# Patient Record
Sex: FEMALE | Race: AMERICAN INDIAN OR ALASKA NATIVE
[De-identification: names, ages, dates, MRNs, and addresses within clinical notes are randomized per-mention and may not be internally consistent; named-entity substitution may affect disease eponyms.]

---

## 2017-04-03 ENCOUNTER — HOSPITAL ENCOUNTER (EMERGENCY)
Dept: HOSPITAL 14 - H.ER | Age: 27
Discharge: HOME | End: 2017-04-03
Payer: COMMERCIAL

## 2017-04-03 VITALS
DIASTOLIC BLOOD PRESSURE: 74 MMHG | RESPIRATION RATE: 18 BRPM | HEART RATE: 104 BPM | TEMPERATURE: 98.2 F | OXYGEN SATURATION: 99 % | SYSTOLIC BLOOD PRESSURE: 130 MMHG

## 2017-04-03 DIAGNOSIS — F41.9: ICD-10-CM

## 2017-04-03 DIAGNOSIS — J06.9: Primary | ICD-10-CM

## 2017-04-03 DIAGNOSIS — F20.9: ICD-10-CM

## 2017-04-03 NOTE — ED PDOC
HPI: CCC, URI, Sore Throat


Time Seen by Provider: 04/03/17 22:52


Chief Complaint (Nursing): Cough, Cold, Congestion


Chief Complaint (Provider): uri


History Per: Patient (27 y/o female h/o Schizophrenia here with complaint of URI

/nasal congestion x 2 days associated with chills. No vomiting/diarrhea/etc.)





Past Medical History


Reviewed: Historical Data, Nursing Documentation, Vital Signs


Vital Signs: 





 Last Vital Signs











Temp  98.2 F   04/03/17 21:58


 


Pulse  104 H  04/03/17 21:58


 


Resp  18   04/03/17 21:58


 


BP  130/74   04/03/17 21:58


 


Pulse Ox  99   04/03/17 21:58














- Medical History


PMH: Anxiety, Depression, Schizophrenia





- Family History


Family History: States: No Known Family Hx





- Home Medications


Home Medications: 


 Ambulatory Orders











 Medication  Instructions  Recorded


 


Ciprofloxacin/Dexamethasone 4 drop OT BID #1 bottle 10/05/14





[Ciprodex 0.3%-0.1% 7.5 Ml]  


 


Neomycin/Polymyxin/Hydrocortis 3 drop OT TID #1 bottle 10/06/14





[Cortisporin Otic Susp]  


 


Acetic Acid/Antipyrine/Benzo 2 drop AD TID #1 sol 12/01/14





[Auralgan 14 ml]  


 


Acetaminophen [Acetaminophen Extra 2 tab PO Q6 PRN #24 tablet 04/03/17





Strength]  


 


Fluticasone Propionate [Flonase] 2 spr IN DAILY #1 bottle 04/03/17


 


Non-Formulary 1 ea IN DAILY PRN #1 ea 04/03/17














- Allergies


Allergies/Adverse Reactions: 


 Allergies











Allergy/AdvReac Type Severity Reaction Status Date / Time


 


No Known Allergies Allergy   Verified 10/05/14 10:25














Review of Systems


ROS Statement: Except As Marked, All Systems Reviewed And Found Negative


ENT: Positive for: Nose Congestion





Physical Exam





- Reviewed


Nursing Documentation Reviewed: Yes


Vital Signs Reviewed: Yes





- Physical Exam


Appears: Positive for: Well, Non-toxic, No Acute Distress


Head Exam: Positive for: ATRAUMATIC, NORMAL INSPECTION, NORMOCEPHALIC


Skin: Positive for: Normal Color, Warm, DRY


Eye Exam: Positive for: EOMI, Normal appearance, PERRL


ENT: Positive for: Normal ENT Inspection, Nasal Congestion


Neck: Positive for: Normal, Painless ROM


Cardiovascular/Chest: Positive for: Regular Rate, Rhythm


Respiratory: Positive for: CNT, Normal Breath Sounds


Gastrointestinal/Abdominal: Positive for: Normal Exam, Bowel Sounds, Soft


Back: Positive for: Normal Inspection


Extremity: Positive for: Normal ROM


Neurologic/Psych: Positive for: Alert, Oriented





- ECG


O2 Sat by Pulse Oximetry: 99





- Progress


ED Course And Treament: 


acetaminophen 975mg x 1 dose





Patient is on multiple medications for schizophrenia.  d/w her use of nasal 

saline rinse instead of nasal decongestants.





Disposition





- Clinical Impression


Clinical Impression: 


 URI (upper respiratory infection)





- Patient ED Disposition


Is Patient to be Admitted: No





- Disposition


Disposition: Routine/Home


Disposition Time: 22:55


Condition: FAIR


Prescriptions: 


Acetaminophen [Acetaminophen Extra Strength] 2 tab PO Q6 PRN #24 tablet


 PRN Reason: Headache


Fluticasone Propionate [Flonase] 2 spr IN DAILY #1 bottle


Non-Formulary 1 ea IN DAILY PRN #1 ea


 PRN Reason: Cough And Congestion


Instructions:  Upper Respiratory Infection (ED)


Forms:  [facility] ED School/Work Excuse

## 2017-05-21 ENCOUNTER — HOSPITAL ENCOUNTER (INPATIENT)
Dept: HOSPITAL 14 - H.ER | Age: 27
LOS: 5 days | Discharge: HOME | DRG: 430 | End: 2017-05-26
Attending: PSYCHIATRY & NEUROLOGY | Admitting: PSYCHIATRY & NEUROLOGY
Payer: MEDICAID

## 2017-05-21 VITALS — OXYGEN SATURATION: 98 %

## 2017-05-21 DIAGNOSIS — F32.9: ICD-10-CM

## 2017-05-21 DIAGNOSIS — J06.9: ICD-10-CM

## 2017-05-21 DIAGNOSIS — F41.9: ICD-10-CM

## 2017-05-21 DIAGNOSIS — D64.9: ICD-10-CM

## 2017-05-21 DIAGNOSIS — F22: ICD-10-CM

## 2017-05-21 DIAGNOSIS — F25.9: Primary | ICD-10-CM

## 2017-05-21 LAB
ALBUMIN/GLOB SERPL: 1.1 {RATIO} (ref 1–2.1)
ALP SERPL-CCNC: 59 U/L (ref 38–126)
ALT SERPL-CCNC: 27 U/L (ref 9–52)
AST SERPL-CCNC: 20 U/L (ref 14–36)
BASOPHILS # BLD AUTO: 0.1 K/UL (ref 0–0.2)
BASOPHILS NFR BLD: 0.6 % (ref 0–2)
BILIRUB SERPL-MCNC: 0.1 MG/DL (ref 0.2–1.3)
BILIRUB UR-MCNC: NEGATIVE MG/DL
BUN SERPL-MCNC: 8 MG/DL (ref 7–17)
CALCIUM SERPL-MCNC: 9 MG/DL (ref 8.4–10.2)
CHLORIDE SERPL-SCNC: 103 MMOL/L (ref 98–107)
CO2 SERPL-SCNC: 26 MMOL/L (ref 22–30)
COLOR UR: (no result)
EOSINOPHIL # BLD AUTO: 0.2 K/UL (ref 0–0.7)
EOSINOPHIL NFR BLD: 2 % (ref 0–4)
ERYTHROCYTE [DISTWIDTH] IN BLOOD BY AUTOMATED COUNT: 15.3 % (ref 11.5–14.5)
ETHANOL SERPL-MCNC: < 10 MG/DL (ref 0–10)
GLOBULIN SER-MCNC: 4.1 GM/DL (ref 2.2–3.9)
GLUCOSE SERPL-MCNC: 94 MG/DL (ref 65–105)
GLUCOSE UR STRIP-MCNC: (no result) MG/DL
HCT VFR BLD CALC: 36.1 % (ref 34–47)
KETONES UR STRIP-MCNC: (no result) MG/DL
LEUKOCYTE ESTERASE UR-ACNC: (no result) LEU/UL
LYMPHOCYTES # BLD AUTO: 2.4 K/UL (ref 1–4.3)
LYMPHOCYTES NFR BLD AUTO: 28.5 % (ref 20–40)
MCH RBC QN AUTO: 30.7 PG (ref 27–31)
MCHC RBC AUTO-ENTMCNC: 33.2 G/DL (ref 33–37)
MCV RBC AUTO: 92.7 FL (ref 81–99)
MONOCYTES # BLD: 0.9 K/UL (ref 0–0.8)
MONOCYTES NFR BLD: 11.3 % (ref 0–10)
NEUTROPHILS # BLD: 4.8 K/UL (ref 1.8–7)
NEUTROPHILS NFR BLD AUTO: 57.6 % (ref 50–75)
NRBC BLD AUTO-RTO: 0.2 % (ref 0–0)
PH UR STRIP: 6 [PH] (ref 5–8)
PLATELET # BLD: 199 K/UL (ref 130–400)
PMV BLD AUTO: 9.5 FL (ref 7.2–11.7)
POTASSIUM SERPL-SCNC: 4.2 MMOL/L (ref 3.6–5)
PROT SERPL-MCNC: 8.6 G/DL (ref 6.3–8.2)
PROT UR STRIP-MCNC: 30 MG/DL
RBC # UR STRIP: (no result) /UL
SODIUM SERPL-SCNC: 140 MMOL/L (ref 132–148)
SP GR UR STRIP: 1.03 (ref 1–1.03)
UROBILINOGEN UR-MCNC: (no result) MG/DL (ref 0.2–1)
WBC # BLD AUTO: 8.3 K/UL (ref 4.8–10.8)

## 2017-05-21 NOTE — ED PDOC
HPI: Psych/Substance Abuse


Time Seen by Provider: 05/21/17 13:33


Chief Complaint (Nursing): Psychiatric Evaluation


Chief Complaint (Provider): Anxiety


History Per: Patient


History/Exam Limitations: no limitations


Onset/Duration Of Symptoms: Days (x3)


Current Symptoms Are (Timing): Still Present


Suicide/Self Injury Attempted (Context): None


Modifying Factor(s): None


Severity: Mild


Associated Symptoms: Anxiety, Depression, Paranoia


Additional Complaint(s): 





Patient is a 27 year old female presenting ot the ED complaining of anxiety x3 

days. Anxiety is associated with depression and paranoia. Patient has not been 

taking her psych medication for the past 3 days because she left her medication 

at a friends house who is currently on vacation. Denies hallucinations, 

suicidal ideation, and homicidal ideation. Patient subsequently complains of 

cough and congestion. Denies fever, shortness of breath, or chest pain.





PMD: none





Past Medical History


Reviewed: Historical Data, Nursing Documentation, Vital Signs


Vital Signs: 





 Last Vital Signs











Temp  97.3 F L  05/21/17 13:56


 


Pulse  106 H  05/21/17 13:56


 


Resp  18   05/21/17 13:56


 


BP  110/42 L  05/21/17 13:56


 


Pulse Ox  98   05/21/17 13:56














- Medical History


PMH: Anxiety, Depression, Schizophrenia





- Family History


Family History: States: No Known Family Hx





- Home Medications


Home Medications: 


 Ambulatory Orders











 Medication  Instructions  Recorded


 


Ciprofloxacin/Dexamethasone 4 drop OT BID #1 bottle 10/05/14





[Ciprodex 0.3%-0.1% 7.5 Ml]  


 


Neomycin/Polymyxin/Hydrocortis 3 drop OT TID #1 bottle 10/06/14





[Cortisporin Otic Susp]  


 


Acetic Acid/Antipyrine/Benzo 2 drop AD TID #1 sol 12/01/14





[Auralgan 14 ml]  


 


Acetaminophen [Acetaminophen Extra 2 tab PO Q6 PRN #24 tablet 04/03/17





Strength]  


 


Fluticasone Propionate [Flonase] 2 spr IN DAILY #1 bottle 04/03/17


 


Non-Formulary 1 ea IN DAILY PRN #1 ea 04/03/17














- Allergies


Allergies/Adverse Reactions: 


 Allergies











Allergy/AdvReac Type Severity Reaction Status Date / Time


 


No Known Allergies Allergy   Verified 05/21/17 19:12














Review of Systems


ROS Statement: Except As Marked, All Systems Reviewed And Found Negative


Constitutional: Negative for: Fever


Cardiovascular: Negative for: Chest Pain


Respiratory: Negative for: Shortness of Breath


Psych: Positive for: Anxiety, Depression, Other (paranoia).  Negative for: 

Suicidal ideation





Physical Exam





- Reviewed


Nursing Documentation Reviewed: Yes


Vital Signs Reviewed: Yes





- Physical Exam


Appears: Positive for: Well, Non-toxic, No Acute Distress


Head Exam: Positive for: ATRAUMATIC, NORMAL INSPECTION, NORMOCEPHALIC


Skin: Positive for: Normal Color, Warm, DRY


Eye Exam: Positive for: Normal appearance, EOMI


Neck: Positive for: Normal, Painless ROM


Cardiovascular/Chest: Positive for: Regular Rate, Rhythm.  Negative for: Gallop

, Murmur


Respiratory: Positive for: Normal Breath Sounds.  Negative for: Accessory 

Muscle Use, Respiratory Distress


Extremity: Positive for: Normal ROM


Neurologic/Psych: Positive for: Alert, Oriented, Mood/Affect (seems 

apprehensive but cooperative)





- Laboratory Results


Result Diagrams: 


 05/21/17 16:06





 05/21/17 16:06





- ECG


O2 Sat by Pulse Oximetry: 98 (RA)


Pulse Ox Interpretation: Normal





- Progress


ED Course And Treament: 





Pt. evaluated by crisis and arrangements made for admission. 





Medical Decision Making


Medical Decision Making: 





Time: 13:35


Impression: 28 y/o female c/o anxiety paranoia and depression





Plan: 


Drug Screen


UPreg


Crisis Eval





Scribe Attestation:


Documented by Bella Carpenter acting as a scribe for Rajinder Saez. 





Provider Attestation:


All medical record entries made by the Scribe were at my direction and 

personally dictated by me. I have reviewed the chart and agree that the record 

accurately reflects my personal performance of the history, physical exam, 

medical decision making, and the department course for this patient. I have 

also personally directed, reviewed, and agree with the discharge instructions 

and disposition.








Disposition





- Clinical Impression


Clinical Impression: 


 Schizoaffective disorder








- Patient ED Disposition


Is Patient to be Admitted: No





- Disposition


Disposition Time: 16:43


Condition: STABLE

## 2017-05-22 RX ADMIN — DIVALPROEX SODIUM SCH MG: 500 TABLET, DELAYED RELEASE ORAL at 16:20

## 2017-05-22 RX ADMIN — DIVALPROEX SODIUM SCH MG: 500 TABLET, DELAYED RELEASE ORAL at 10:51

## 2017-05-22 NOTE — CP.PCM.CON
History of Present Illness





- History of Present Illness


History of Present Illness: 





26 yo female with no significant PMH admitted to Psyche unit because of 

worsening depression ad anxiety. Pt also complained of nasal congestion and 

coughing productive with yelow sputum the last 3 days.











Review of Systems





- Review of Systems


All systems: reviewed and no additional remarkable complaints except (aside 

from those mentioned above, 12 point system review were negative by me)





Past Patient History





- Infectious Disease


Hx of Infectious Diseases: None





- Tetanus Immunizations


Tetanus Immunization: Unknown





- Past Medical History & Family History


Past Medical History?: No


Past Family History: Reviewed and not pertinent





- Past Social History


Smoking Status: Never Smoked


Chewing Tobacco Use: No


Cigar Use: No


Alcohol: None


Drugs: Denies





- CARDIAC


Hx Cardiac Disorders: No





- PULMONARY


Hx Respiratory Disorders: No


Hx Bronchitis: Yes





- NEUROLOGICAL


Hx Neurological Disorder: No





- HEENT


Hx HEENT Problems: No





- RENAL


Hx Chronic Kidney Disease: No





- ENDOCRINE/METABOLIC


Hx Endocrine Disorders: No





- HEMATOLOGICAL/ONCOLOGICAL


Hx Blood Disorders: No





- INTEGUMENTARY


Hx Dermatological Problems: No





- MUSCULOSKELETAL/RHEUMATOLOGICAL


Hx Musculoskeletal Disorders: No





- GENITOURINARY/GYNECOLOGICAL


Hx Genitourinary Disorders: No





- PSYCHIATRIC


Hx Depression: Yes





- ANESTHESIA


Hx Anesthesia: No





Meds


Allergies/Adverse Reactions: 


 Allergies











Allergy/AdvReac Type Severity Reaction Status Date / Time


 


No Known Allergies Allergy   Verified 05/21/17 19:12














- Medications


Medications: 


 Current Medications





Acetaminophen (Tylenol 325mg Tab)  650 mg PO Q4 PRN


   PRN Reason: for pain 4-7


Al Hydrox/Mg Hydrox/Simethicone (Maalox Plus 30 Ml)  30 ml PO Q4 PRN


   PRN Reason: Dyspepsia


Benztropine Mesylate (Cogentin)  1 mg PO BID [facility]


   Last Admin: 05/22/17 10:51 Dose:  1 mg


Buspirone HCl (Buspar)  10 mg PO TID [facility]


   Last Admin: 05/22/17 12:10 Dose:  10 mg


Citalopram Hydrobromide (Celexa)  20 mg PO DAILY [facility]


   Last Admin: 05/22/17 10:51 Dose:  20 mg


Diphenhydramine HCl (Benadryl)  50 mg IM Q6 PRN


   PRN Reason: Extrapyramidal S/S Unable PO


Diphenhydramine HCl (Benadryl)  50 mg PO Q6 PRN


   PRN Reason: Extrapyramidal Symptoms


Divalproex Sodium (Depakote Dr(*Bid*))  500 mg PO BID JOSE


   Last Admin: 05/22/17 10:51 Dose:  500 mg


Lorazepam (Ativan)  2 mg IM Q4 PRN


   PRN Reason: Anxiety/Agitation,Unable PO


Lorazepam (Ativan)  2 mg PO Q4 PRN


   PRN Reason: Anxiety/Agitation


Magnesium Hydroxide (Milk Of Magnesia)  30 ml PO HS PRN


   PRN Reason: Constipation


Trazodone HCl (Desyrel)  50 mg PO HS PRN


   PRN Reason: Insomnia











Physical Exam





- Constitutional


Appears: No Acute Distress





- Head Exam


Head Exam: ATRAUMATIC





- Eye Exam


Eye Exam: absent: Scleral icterus





- ENT Exam


ENT Exam: Mucous Membranes Moist





- Neck Exam


Neck exam: Negative for: Meningismus





- Respiratory Exam


Respiratory Exam: absent: Rhonchi, Wheezes, Respiratory Distress





- Cardiovascular Exam


Cardiovascular Exam: REGULAR RHYTHM, +S1, +S2





- GI/Abdominal Exam


GI & Abdominal Exam: Soft.  absent: Tenderness





- Rectal Exam


Rectal Exam: Deferred





- Extremities Exam


Extremities exam: Negative for: pedal edema





- Back Exam


Back exam: NORMAL INSPECTION





- Neurological Exam


Neurological exam: Alert, Oriented x3





- Psychiatric Exam


Psychiatric exam: Normal Affect





- Skin


Skin Exam: Dry, Intact





Results





- Vital Signs


Recent Vital Signs: 


 Last Vital Signs











Temp  97.4 F L  05/22/17 06:00


 


Pulse  93 H  05/22/17 06:00


 


Resp  18   05/22/17 06:00


 


BP  118/73   05/22/17 06:00


 


Pulse Ox  98   05/21/17 23:13














- Labs


Result Diagrams: 


 05/21/17 16:06





 05/21/17 16:06


Labs: 


 Laboratory Results - last 24 hr











  05/21/17





  17:25


 


Valproic Acid  60.0














Assessment & Plan


(1) Depression


Status: Acute   


Comment: psyche is managing

## 2017-05-22 NOTE — PCM.PSYCH
Initial Psychiatric Evaluation





- Initial Psychiatric Evaluation


Type of Admission: Voluntary


Legal Status: Capacity


Chief Complaint (in patient's own words): 


"I'm been depressed"


Patient's Reaction to Hospitalization: 


HPI: 26 y/o  female [facility] 

[facility]. Pt reports current sx of depression and anxiety which have worsened 

over the past few weeks.  Pt reports that her meds were left in a friend's 

apartment who is now on vacation and unavailable to contact. Pt reports that 

she has been experiencing "bad thoughts" triggered by current stressors, 

however denied that thoughts were related to SI or HI. Pt denied current SI/HI, 

attempts, intent, or plans. Pt reports a hx of auditory hallucinations, however 

denied current hallucinations. Pt reports currently feeling paranoid and feels 

that her medications are not helpful in treating her sx. Pt denied substance 

abuse.





Collateral history obtained from ER crisis worker: [facility]  

Santiago Akhtar reports that pt contacted him today requesting to be transported 

to the hospital. Pt stated that she was having "bad thoughts", however refused 

to further describe the nature of her thoughts. Pt has been without medication 

for several days due to leaving it at her friend's house who is currently out 

of town. Pt has a hx of Schizoaffective Disorder, with current anxious thoughts 

and depressed mood. Mr. Akhtar reports that the pt has never explicitly stated 

thoughts of wanting to harm herself or others, however Mr. Akhtar reports that 

he is concerned that the pt may not be forthcoming with severity of thoughts. 

Pt is currently receiving outpatient treatment with [facility] partial care program 

and has been compliant with treatment.





PPHx: Pt was hospitalized at [facility] in December 2016. Pt is currently 

receiving outpatient services through [facility] partial care and is linked to [facility]. 

Pt has been compliant with treatment. Cogentin 1mg BID, Buspar 10 mg PO TID , 

Depakote 500 mg PO BID, Invega 156mg (next due June 13th), Trazodone 50 mg PO HS

, Celexa 10 mg PO Daily.





MHx: Anemia





All: NKDA





SHx: 12th grade. Unemployed. Denies drug use or etoh use. 





FHx: Pt reports that her mother attempted suicide a few years ago by attempting 

to jump out of a window. 


Current Medications: 





Active Medications











Generic Name Dose Route Start Last Admin





  Trade Name Freq  PRN Reason Stop Dose Admin


 


Acetaminophen  650 mg  05/22/17 01:41  





  Tylenol 325mg Tab  PO   





  Q4 PRN   





  for pain 4-7   


 


Al Hydrox/Mg Hydrox/Simethicone  30 ml  05/22/17 01:41  





  Maalox Plus 30 Ml  PO   





  Q4 PRN   





  Dyspepsia   


 


Diphenhydramine HCl  50 mg  05/22/17 01:41  





  Benadryl  IM   





  Q6 PRN   





  Extrapyramidal S/S Unable PO   


 


Diphenhydramine HCl  50 mg  05/22/17 01:41  





  Benadryl  PO   





  Q6 PRN   





  Extrapyramidal Symptoms   


 


Lorazepam  2 mg  05/22/17 01:41  





  Ativan  IM   





  Q4 PRN   





  Anxiety/Agitation,Unable PO   


 


Lorazepam  2 mg  05/22/17 01:41  





  Ativan  PO   





  Q4 PRN   





  Anxiety/Agitation   


 


Magnesium Hydroxide  30 ml  05/22/17 01:41  





  Milk Of Magnesia  PO   





  HS PRN   





  Constipation   














Past Psychiatric History





- Past Psychiatric History


Previous Treatment History: Inpatient


Pertinent Medical Hx (Current Medical&Sleep Prob, Allergies): 





 Allergies











Allergy/AdvReac Type Severity Reaction Status Date / Time


 


No Known Allergies Allergy   Verified 05/21/17 19:12








 





Ciprofloxacin/Dexamethasone [Ciprodex 0.3%-0.1% 7.5 Ml] 4 drop OT BID #1 bottle 

10/05/14 


Neomycin/Polymyxin/Hydrocortis [Cortisporin Otic Susp] 3 drop OT TID #1 bottle 

10/06/14 


Acetic Acid/Antipyrine/Benzo [Auralgan 14 ml] 2 drop AD TID #1 sol 12/01/14 


Acetaminophen [Acetaminophen Extra Strength] 2 tab PO Q6 PRN #24 tablet 04/03/ 17 


Fluticasone Propionate [Flonase] 2 spr IN DAILY #1 bottle 04/03/17 


Non-Formulary 1 ea IN DAILY PRN #1 ea 04/03/17 











Review of Systems





- Review of Systems


All systems: reviewed and no additional remarkable complaints except





- Psychiatric


Psychiatric: As Per HPI, Anxiety, Depression, Mood Swings, Paranoia





Mental Status Examination





- Personal Presentation


Personal Presentation: Looks stated age (Poor hygiene, hair unkempt)





- Affect


Affect: Broad





- Motor Activity


Motor Activity: Calm





- Reliability in Providing Information


Reliability in Providing Information: Fair





- Speech


Speech: Organized





- Mood


Mood: Depressed, Anxious





- Formal Thought Process


Formal Thought Process: Paranoia





- Obsessions/Compulsions


Obsessions: No


Compulsions: No





- Cognitive Functions


Orientation: Person, Place, Situation, Time


Sensorium: Alert


Estimate of Intelligence: Average


Judgement: Intact, as evidence by: Insight regarding need for hospitalization


Memory: Recent intact, as evidence by: Ability to recall events of the day, 

Remote intact, as evidenced by: Abilit to recall sig. life events, Remote intact

, as evidenced by: Ability to recall historical events





- Risk


Risk: Diminished functioning





- Strength & Assets Inventory


Strength & Assets Inventory: Cooperative





DSM 5 DX





- DSM 5


DSM 5 Diagnosis: 


Schizoaffective Disorder





- Recommended/Plan of Treatment


Treatment Recommendations and Plan of Treatment: 


-Admit to psychiatry


-Restart home medications: Depakote 500 mg PO BID, Cogentin 1 mg PO BID, Buspar 

10 mg PO TID


-Increase Celexa to 20 mg PO Daily


-Change Trazodone to 50 mg PO PRN as the patient reports that it makes her too 

tired


-Invega Sustenna injection due 6/13/17, as per patient


-Individual, group and milieu tx


-Disposition planning


Projected ELOS: 4-7 days


Discharge Plan and Discharge Criteria: 


-Discharge when psychiatrically stable





- Smoking Cessation


Smoking Cessation Initiated: No


Reason for not providing: Not indicated

## 2017-05-23 RX ADMIN — GUAIFENESIN AND DEXTROMETHORPHAN PRN ML: 100; 10 SYRUP ORAL at 19:14

## 2017-05-23 RX ADMIN — DIVALPROEX SODIUM SCH MG: 500 TABLET, DELAYED RELEASE ORAL at 08:31

## 2017-05-23 RX ADMIN — DIVALPROEX SODIUM SCH MG: 500 TABLET, DELAYED RELEASE ORAL at 17:01

## 2017-05-23 RX ADMIN — GUAIFENESIN AND DEXTROMETHORPHAN PRN ML: 100; 10 SYRUP ORAL at 12:58

## 2017-05-23 NOTE — PCM.PYCHPN
Psychiatric Progress Note





- Psychiatric Progress Note


Patient seen today, length of contact: Patient evaluated, case discussed with 

team, chart reviewed, 35 min 


Patient Chief Complaint: 


"I'm feeling paranoid."


Problems Identified/Issues Discussed: 


Patient reports that she is feeling very paranoid and worried that someone want 

to harm her.  She reports that she continues to feel depressed and distressed 

by the paranoia.  +Anxiety.  NO SI/HI.  


Medication Change: Yes (Start Risperdal 1 mg PO Daily)


Medical Record Reviewed: Yes


Consults ordered or reviewed: 


Medicine Consult





Mental Status Examination





- Cognitive Function


Orientation: Person, Place, Situation, Time


Memory: Intact


Attention: WNL


Concentration: WNL


Association: WN


Fund of Knowledge: WNL





- Mood


Mood: Depressed, Anxious





- Affect


Affect: Constricted





- Speech


Speech: Appropriate





- Formal Thought Process


Formal Thought Process: Paranoia


Psychotic Thoughts and Behaviors: 


+Paranoia





- Suicidal Ideation


Suicidal Ideation: No





- Homicidal Ideation


Homicidal Ideation: No





Goal/Treatment Plan





- Goal/Treatment Plan


Need for Continued Stay: Remain at risks for inpatient hospitalization, 

Discharge may exacerbated symptoms


Progress Toward Problem(s) and Goals/Treatment Plan: 


28 yo female w/ history of schizoaffective disorder, continues to report 

significant paranoia and depression.  Needs continued hospitalization for 

treatment and stabilization.  





-Continue Depakote 500 mg PO BID, Cogentin 1 mg PO BID, Buspar 10 mg PO TID


-Continue Celexa 20 mg PO Daily


-Start Risperdal 1 mg PO Daily


-Continue Trazodone 50 mg PO PRN 


-Invega Sustenna injection due 6/13/17, as per patient


-Individual, group and milieu tx


-Disposition planning


Estimated Date of D/C: 05/29/17

## 2017-05-24 RX ADMIN — GUAIFENESIN AND DEXTROMETHORPHAN PRN ML: 100; 10 SYRUP ORAL at 08:35

## 2017-05-24 RX ADMIN — DIVALPROEX SODIUM SCH MG: 500 TABLET, DELAYED RELEASE ORAL at 17:15

## 2017-05-24 RX ADMIN — GUAIFENESIN AND DEXTROMETHORPHAN PRN ML: 100; 10 SYRUP ORAL at 17:15

## 2017-05-24 RX ADMIN — DIVALPROEX SODIUM SCH MG: 500 TABLET, DELAYED RELEASE ORAL at 08:36

## 2017-05-24 NOTE — PCM.PYCHPN
Psychiatric Progress Note





- Psychiatric Progress Note


Patient seen today, length of contact: Patient evaluated, case discussed with 

team, chart reviewed, 35 min 


Patient Chief Complaint: 


"I'm feeling less paranoid."


Problems Identified/Issues Discussed: 


Patient reports that she is feeling less paranoid and she believes the 

Risperdal is helping.  No adverse effects to medications noted.   She reports 

that she continues to feel intermittently distressed by the paranoia.   NO SI/

HI.  


Medication Change: No


Medical Record Reviewed: Yes


Consults ordered or reviewed: 


Medicine consult





Mental Status Examination





- Cognitive Function


Orientation: Person, Place, Situation, Time


Memory: Intact


Attention: WNL


Concentration: WNL


Association: WNL


Fund of Knowledge: [facility]


Decription of patient's judgement and insights: 


Fair I/J





- Mood


Mood: Anxious





- Affect


Affect: Constricted





- Speech


Speech: Appropriate





- Formal Thought Process


Formal Thought Process: Paranoia


Psychotic Thoughts and Behaviors: 


+Paranoia is improving





- Suicidal Ideation


Suicidal Ideation: No





- Homicidal Ideation


Homicidal Ideation: No





Goal/Treatment Plan





- Goal/Treatment Plan


Need for Continued Stay: Remain at risks for inpatient hospitalization, 

Discharge may exacerbated symptoms


Progress Toward Problem(s) and Goals/Treatment Plan: 


28 yo female w/ history of schizoaffective disorder, now reporting some 

improvement in her depression and paranoia, needs continued hospitalization for 

treatment and stabilization.  





-Continue Depakote 500 mg PO BID, Cogentin 1 mg PO BID, Buspar 10 mg PO TID


-Continue Celexa 20 mg PO Daily


-Continue Risperdal 1 mg PO Daily


-Continue Trazodone 50 mg PO PRN 


-Invega Sustenna injection due 6/13/17, as per patient


-Individual, group and milieu tx


-Disposition planning- Possible discharge Friday if the patient continues to 

improve clinically.


Estimated Date of D/C: 05/26/17

## 2017-05-24 NOTE — RAD
HISTORY:

cough with yellow sputum  



COMPARISON:

No prior.



TECHNIQUE:

Chest PA and lateral



FINDINGS:



LUNGS:

No focal consolidation.  The interstitial markings are slightly 

increased and coarsened with a few scattered peribronchial cuffing 

changes.  Rule out sequela of reactive/inflammatory airway disease 

and or viral illness. . 



Note is made of a vague small elliptical shaped nodular density right 

lateral upper lung field overlying the right anterior 2nd rib. This 

could represent confluence of shadow artifact however possibility of 

a small osteoma or bone island versus mild parenchymal nodule not 

completely excluded.  Followup radiographs in 2 months suggested to 

assess stability. 



PLEURA:

No significant pleural effusion identified. No pneumothorax apparent.



CARDIOVASCULAR:

Normal.



OSSEOUS STRUCTURES:

No significant abnormalities.



VISUALIZED UPPER ABDOMEN:

Normal.



OTHER FINDINGS:

None.



IMPRESSION:





No focal consolidation.  The interstitial markings are slightly 

increased and coarsened with a few scattered peribronchial cuffing 

changes.  Rule out sequela of reactive/inflammatory airway disease 

and or viral illness. 



Note is made of a vague small elliptical shaped nodular density right 

lateral upper lung field overlying the right anterior 2nd rib. This 

could represent confluence of shadow artifact however possibility of 

a small osteoma or bone island versus mild parenchymal nodule not 

completely excluded.  Followup radiographs in 2 months suggested to 

assess stability.

## 2017-05-25 RX ADMIN — GUAIFENESIN AND DEXTROMETHORPHAN PRN ML: 100; 10 SYRUP ORAL at 17:06

## 2017-05-25 RX ADMIN — DIVALPROEX SODIUM SCH MG: 500 TABLET, DELAYED RELEASE ORAL at 17:05

## 2017-05-25 RX ADMIN — DIVALPROEX SODIUM SCH MG: 500 TABLET, DELAYED RELEASE ORAL at 09:00

## 2017-05-25 RX ADMIN — GUAIFENESIN AND DEXTROMETHORPHAN PRN ML: 100; 10 SYRUP ORAL at 09:00

## 2017-05-25 NOTE — PCM.PYCHPN
Psychiatric Progress Note





- Psychiatric Progress Note


Patient seen today, length of contact: Patient evaluated, case discussed with 

team, chart reviewed, 35 min 


Patient Chief Complaint: 


"I'm feeling less paranoid."


Problems Identified/Issues Discussed: 


No significant events overnight.  Patient continues to improve clinically.  

Patient reports that she is feeling less paranoid and she believes the 

Risperdal is helping.  No adverse effects to medications noted.  NO SI/HI.  


Medication Change: No


Medical Record Reviewed: Yes





Mental Status Examination





- Cognitive Function


Orientation: Person, Place, Situation, Time


Memory: Intact


Attention: WNL


Concentration: WNL


Association: WNL


Fund of Knowledge: [facility]


Decription of patient's judgement and insights: 


Fair I/J





- Mood


Mood: Anxious





- Affect


Affect: Constricted





- Speech


Speech: Appropriate





- Formal Thought Process


Formal Thought Process: Paranoia


Psychotic Thoughts and Behaviors: 


+Paranoia is improving





- Suicidal Ideation


Suicidal Ideation: No





- Homicidal Ideation


Homicidal Ideation: No





Goal/Treatment Plan





- Goal/Treatment Plan


Need for Continued Stay: Remain at risks for inpatient hospitalization, 

Discharge may exacerbated symptoms


Progress Toward Problem(s) and Goals/Treatment Plan: 


28 yo female w/ history of schizoaffective disorder, now reporting improvement 

in her depression and paranoia, needs continued hospitalization for treatment 

and stabilization.  





-Continue Depakote 500 mg PO BID, Cogentin 1 mg PO BID, Buspar 10 mg PO TID


-Continue Celexa 20 mg PO Daily


-Continue Risperdal 1 mg PO Daily


-Stop Trazodone 


-Invega Sustenna injection due 6/13/17, as per patient


-Individual, group and milieu tx


-Disposition planning- Likely discharge Friday if the patient continues to 

improve clinically.


Estimated Date of D/C: 05/26/17





- Smoking Cessation


Smoking Cessation Initiated: No


Reason for not providing: Not indicated

## 2017-05-26 VITALS
TEMPERATURE: 97.1 F | SYSTOLIC BLOOD PRESSURE: 98 MMHG | HEART RATE: 66 BPM | RESPIRATION RATE: 19 BRPM | DIASTOLIC BLOOD PRESSURE: 59 MMHG

## 2017-05-26 RX ADMIN — DIVALPROEX SODIUM SCH MG: 500 TABLET, DELAYED RELEASE ORAL at 09:19

## 2017-05-26 RX ADMIN — GUAIFENESIN AND DEXTROMETHORPHAN PRN ML: 100; 10 SYRUP ORAL at 09:24

## 2017-05-26 NOTE — PCM.PYCHDC
Mental Status Examination





- Mental Status Examination


Orientation: Person, Place, Situation, Time


Memory: Intact


Mood: Neutral


Affect: Broad


Speech: Appropriate


Attention: WNL


Concentration: WNL


Association: WNL


Fund of Knowledge: WNL


Formal Thought Process: No Impairment


Description of patient's judgement and insight: 


Fair I/J


Psychotic Thoughts and Behaviors: 


No AH/VH/paranoia


Suicidal Ideation: No


Current Homicidal Ideation?: No





Discharge Summary





- Discharge Note


Reason for Hospitalization: 


HPI: 28 y/o  female presenting [facility] with [facility] 

[facility]. Pt reports current sx of depression and anxiety which have worsened 

over the past few weeks.  Pt reports that her meds were left in a friend's 

apartment who is now on vacation and unavailable to contact. Pt reports that 

she has been experiencing "bad thoughts" triggered by current stressors, 

however denied that thoughts were related to SI or HI. Pt denied current SI/HI, 

attempts, intent, or plans. Pt reports a hx of auditory hallucinations, however 

denied current hallucinations. Pt reports currently feeling paranoid and feels 

that her medications are not helpful in treating her sx. Pt denied substance 

abuse.





Collateral history obtained from ER crisis worker: [facility]  

Santiago Akhtar reports that pt contacted him today requesting to be transported 

to the hospital. Pt stated that she was having "bad thoughts", however refused 

to further describe the nature of her thoughts. Pt has been without medication 

for several days due to leaving it at her friend's house who is currently out 

of town. Pt has a hx of Schizoaffective Disorder, with current anxious thoughts 

and depressed mood. Mr. Akthar reports that the pt has never explicitly stated 

thoughts of wanting to harm herself or others, however Mr. Akhtar reports that 

he is concerned that the pt may not be forthcoming with severity of thoughts. 

Pt is currently receiving outpatient treatment with [facility] partial [facility] 

and has been compliant with treatment.





PPHx: Pt was hospitalized at [facility] in December 2016. Pt is currently 

receiving outpatient services through [facility] partial care and is linked to [facility]. 

Pt has been compliant with treatment. Cogentin 1mg BID, Buspar 10 mg PO TID , 

Depakote 500 mg PO BID, Invega 156mg (next due June 13th), Trazodone 50 mg PO HS

, Celexa 10 mg PO Daily.





MHx: Anemia





All: NKDA





SHx: 12th grade. Unemployed. Denies drug use or etoh use. 





FHx: Pt reports that her mother attempted suicide a few years ago by attempting 

to jump out of a window. 


Consultations:: List each consultation separately and include:  1. Reason for 

request.  2. Findings.  3. Follow-up


Consultations: 


Medicine consult


Summary of Hospital Course include:: 1. Description of specific treatment plan 

utilized for patients during their course of treatmen.  2. Summarize the time-

course for resolution of acute symptoms and/or regressed behaviors.  3. 

Describe issues identified and worked on during hospitalization.  4. Describe 

medication utilized.  5. Describe medical problems identified and treated.  6. 

Reassessment of suicide risk


Summary of Hospital Course: 


Patient was admitted to the hospital and she was stabilized on the medications 

listed below.  She participated in individual and group therapy. Patient no 

longer reports depression or paranoia.  She would benefit from continued 

outpatient follow-up and medications.  Patient started on Azithromycin for a 

URI.





- Final Diagnosis (DSM 5)


Condition upon Discharge: FAIR


DSM 5: 


Schizoaffective Disorder


Disposition: HOME/ ROUTINE


Follow-up Treatment Plan: 


28 yo female w/ history of schizoaffective disorder, now reporting improvement 

in her depression and paranoia, is psychiatrically stable for discharge.





-Continue Depakote 500 mg PO BID, Cogentin 1 mg PO BID, Buspar 10 mg PO TID


-Continue Celexa 20 mg PO Daily


-Continue Risperdal 1 mg PO Daily


-Stop Trazodone 


-Invega Sustenna injection due 6/13/17, should be 234 mg Qmonthly


-Individual, group and milieu tx


-Discharge today with outpatient follow-up


Prescriptions/Medication Reconciliation: 


Azithromycin [Zithromax] 250 mg PO DAILY #3 tab


Benztropine [Cogentin] 1 mg PO BID #60 tab


busPIRone [Buspar] 10 mg PO TID #90 tab


Citalopram [celEXA] 20 mg PO DAILY #30 tab


Divalproex [Depakote DR(*BID*)] 500 mg PO BID #60 tcp


Fluticasone Propionate [Flonase] 2 spr UMAIR DAILY #1 bottle


risperiDONE [RisperDAL Tab] 1 mg PO DAILY #30 tab





- Smoking Cessation


Smoking Cessation Medication prescribed: No


Reason for not providing: Not indicated





- Antipsychotic Medications


Pt discharged on 2 or more routine antipsychotic medications: Yes





- Justification for 2 or more meds


Plan to taper monotherapy:  List medications: Patient only temporarily on two 

antipsychotics (Risperdal PO and Invega Sustenna) until she can get her next 

standing dosage of Invega Sustenna, then will go back to taking one 

antipsychotic

## 2017-12-05 ENCOUNTER — HOSPITAL ENCOUNTER (INPATIENT)
Dept: HOSPITAL 31 - C.ER | Age: 27
LOS: 10 days | Discharge: HOME | DRG: 430 | End: 2017-12-15
Attending: PSYCHIATRIC UNIT | Admitting: PSYCHIATRIC UNIT
Payer: MEDICAID

## 2017-12-05 DIAGNOSIS — F22: ICD-10-CM

## 2017-12-05 DIAGNOSIS — Z79.899: ICD-10-CM

## 2017-12-05 DIAGNOSIS — F25.1: Primary | ICD-10-CM

## 2017-12-05 DIAGNOSIS — Z81.8: ICD-10-CM

## 2017-12-05 DIAGNOSIS — D64.9: ICD-10-CM

## 2017-12-05 DIAGNOSIS — R45.851: ICD-10-CM

## 2017-12-05 DIAGNOSIS — Z81.4: ICD-10-CM

## 2017-12-05 LAB
ALBUMIN/GLOB SERPL: 1.3 {RATIO} (ref 1–2.1)
ALP SERPL-CCNC: 73 U/L (ref 38–126)
ALT SERPL-CCNC: 28 U/L (ref 9–52)
AST SERPL-CCNC: 15 U/L (ref 14–36)
BASOPHILS # BLD AUTO: 0.1 K/UL (ref 0–0.2)
BASOPHILS NFR BLD: 0.8 % (ref 0–2)
BILIRUB SERPL-MCNC: 0.3 MG/DL (ref 0.2–1.3)
BILIRUB UR-MCNC: NEGATIVE MG/DL
BUN SERPL-MCNC: 12 MG/DL (ref 7–17)
CALCIUM SERPL-MCNC: 8 MG/DL (ref 8.6–10.4)
CHLORIDE SERPL-SCNC: 104 MMOL/L (ref 98–107)
CO2 SERPL-SCNC: 25 MMOL/L (ref 22–30)
EOSINOPHIL # BLD AUTO: 0.1 K/UL (ref 0–0.7)
EOSINOPHIL NFR BLD: 1.4 % (ref 0–4)
ERYTHROCYTE [DISTWIDTH] IN BLOOD BY AUTOMATED COUNT: 12.8 % (ref 11.5–14.5)
ETHANOL SERPL-MCNC: < 10 MG/DL (ref 0–10)
GLOBULIN SER-MCNC: 3 GM/DL (ref 2.2–3.9)
GLUCOSE SERPL-MCNC: 97 MG/DL (ref 65–105)
GLUCOSE UR STRIP-MCNC: NORMAL MG/DL
HCT VFR BLD CALC: 33.9 % (ref 34–47)
KETONES UR STRIP-MCNC: NEGATIVE MG/DL
LEUKOCYTE ESTERASE UR-ACNC: (no result) LEU/UL
LYMPHOCYTES # BLD AUTO: 2.9 K/UL (ref 1–4.3)
LYMPHOCYTES NFR BLD AUTO: 42.2 % (ref 20–40)
MCH RBC QN AUTO: 29.9 PG (ref 27–31)
MCHC RBC AUTO-ENTMCNC: 32.9 G/DL (ref 33–37)
MCV RBC AUTO: 90.9 FL (ref 81–99)
MONOCYTES # BLD: 0.5 K/UL (ref 0–0.8)
MONOCYTES NFR BLD: 8 % (ref 0–10)
NRBC BLD AUTO-RTO: 0.1 % (ref 0–2)
PH UR STRIP: 7 [PH] (ref 5–8)
PLATELET # BLD: 238 K/UL (ref 130–400)
PMV BLD AUTO: 9.2 FL (ref 7.2–11.7)
POTASSIUM SERPL-SCNC: 3.9 MMOL/L (ref 3.6–5.2)
PROT SERPL-MCNC: 7 G/DL (ref 6.3–8.3)
PROT UR STRIP-MCNC: NEGATIVE MG/DL
RBC # UR STRIP: NEGATIVE /UL
SODIUM SERPL-SCNC: 137 MMOL/L (ref 132–148)
SP GR UR STRIP: 1.02 (ref 1–1.03)
UROBILINOGEN UR-MCNC: 2 MG/DL (ref 0.2–1)
WBC # BLD AUTO: 6.8 K/UL (ref 4.8–10.8)
WBC #/AREA URNS HPF: 1 /HPF (ref 0–5)

## 2017-12-05 PROCEDURE — GZ56ZZZ INDIVIDUAL PSYCHOTHERAPY, SUPPORTIVE: ICD-10-PCS | Performed by: PSYCHIATRY & NEUROLOGY

## 2017-12-05 PROCEDURE — GZHZZZZ GROUP PSYCHOTHERAPY: ICD-10-PCS | Performed by: PSYCHIATRY & NEUROLOGY

## 2017-12-05 PROCEDURE — GZ58ZZZ INDIVIDUAL PSYCHOTHERAPY, COGNITIVE-BEHAVIORAL: ICD-10-PCS | Performed by: PSYCHIATRY & NEUROLOGY

## 2017-12-05 NOTE — PCM.BM
<Melody Fuller - Last Filed: 12/05/17 20:00>





Treatment Plan Problems





- Problems identified on initial assessmt


  ** depression


Date Initiated: 12/05/17


Time Initiated: 20:45


Assessment reference: NA


Status: Active





  ** Auditory hallucination


Date Initiated: 12/05/17


Time Initiated: 20:45


Assessment reference: NA


Status: Active





Treatment assets and liabiliti


Patient Assests: cooperative, ADL independent, negotiates basic needs


Patient Liabilities: auditory impairment





<Danielle Lazo - Last Filed: 12/08/17 10:18>





- Diagnosis


(1) Schizoaffective disorder


Status: Acute   


Interventions: 





12/08/17 10:18


* Assess/adjust medications daily and /or as needed


* See patient on an individual basis 7x/week to assess status of hallucinations


* Discuss risks, benefits, side effects and alternatives of medications


* 








<Esperanza Russell - Last Filed: 12/08/17 11:07>





Family Contact


Family involvement: Famliy/SO not involved





- Goals for Treatment


Patient goals for treatment: "I want to go a group home."





Discharge/Continuing Care





- Education Needs


Education Needs: Patient Medication, Patient Coping Skills





- Discharge


Discharge Criteria: Tolerates medication w/o severe side effects, Reduction of 

target symptoms


Discharge to:: Home





- Treatment Team Participation


Discussed with Family/SO: No


Was Patient/Family/SO present at Treatment Team Meeting: Yes

## 2017-12-05 NOTE — C.PDOC
History Of Present Illness


27 year old female with prior psych Hx presents to the ED for evaluation of 

paranoia, depression and auditory hallucinations. Patient states she feels 

anxious, hearing voices that tell her to hurt herself which has been occurring 

for approximately 1 week. Patient denies substance abuse or any other physical 

complaints at the time. 


Time Seen by Provider: 12/05/17 17:26


Chief Complaint (Nursing): Psychiatric Evaluation


History Per: Patient


History/Exam Limitations: no limitations


Onset/Duration Of Symptoms: Days


Current Symptoms Are (Timing): Still Present


Modifying Factor(s): None


Severity: Mild


Associated Symptoms: Depression, Paranoia, Suicidal Thoughts


Recent travel outside of the [place]: No


Additional History Per: Patient





Past Medical History


Reviewed: Historical Data, Nursing Documentation, Vital Signs


Vital Signs: 


 Last Vital Signs











Temp  98 F   12/07/17 06:40


 


Pulse  86   12/07/17 16:08


 


Resp  18   12/07/17 06:40


 


BP  119/76   12/07/17 16:08


 


Pulse Ox  99   12/05/17 19:26














- Medical History


PMH: Anxiety, Depression, Schizophrenia


   Denies: Diabetes, Hepatitis, HIV, HTN, Seizures, Sexually Transmitted Disease


Surgical History: No Surg Hx


Family History: States: Unknown Family Hx





- Social History


Hx Tobacco Use: No


Hx Alcohol Use: No


Hx Substance Use: No





- Immunization History


Hx Influenza Vaccination: No





Review Of Systems


Constitutional: Negative for: Fever, Chills


Cardiovascular: Negative for: Chest Pain


Respiratory: Negative for: Cough, Shortness of Breath


Gastrointestinal: Negative for: Nausea, Vomiting, Abdominal Pain


Skin: Negative for: Rash


Neurological: Negative for: Weakness, Numbness


Psych: Positive for: Depression, Suicidal ideation





Physical Exam





- Physical Exam


Appears: Non-toxic, Other (Bizarre affect)


Skin: Normal Color, Warm, Dry


Head: Atraumatic, Normacephalic


Nose: No Discharge


Oral Mucosa: Moist


Neck: Normal ROM, Supple


Chest: Symmetrical


Cardiovascular: Rhythm Regular, No Murmur


Respiratory: Normal Breath Sounds, No Rales, No Rhonchi, No Wheezing


Gastrointestinal/Abdominal: Soft, No Tenderness


Extremity: Normal ROM, No Pedal Edema, No Calf Tenderness, No Swelling


Neurological/Psych: Oriented x3, Normal Speech, Normal Cognition


Gait: Steady





ED Course And Treatment





- Laboratory Results


Result Diagrams: 


 12/05/17 18:08





 12/05/17 18:08


O2 Sat by Pulse Oximetry: 98 (On RA)


Pulse Ox Interpretation: Normal





Medical Decision Making


Medical Decision Making: 


Plan:


* Blood work ordered


* UA ordered





Assessment: Paranoia, auditory hallucinations.


Pt is medically cleared for crisis and for admission.


Pt will be admitted for schizophrenia/depression to psych under the services of 

Dr. Betts





Disposition


Discussed With Dr.: Russell Betts


Doctor Will See Patient In The: Hospital


Counseled Patient/Family Regarding: Studies Performed, Diagnosis





- Disposition


Disposition: HOSPITALIZED


Disposition Time: 18:52


Condition: FAIR





- Clinical Impression


Clinical Impression: 


 Schizoaffective disorder, Depression








- Scribe Statement


The provider has reviewed the documentation as recorded by the Scribe





Jama Gupta





All medical record entries made by the Scribe were at my direction and 

personally dictated by me. I have reviewed the chart and agree that the record 

accurately reflects my personal performance of the history, physical exam, 

medical decision making, and the department course for this patient. I have 

also personally directed, reviewed, and agree with the discharge instructions 

and disposition.

## 2017-12-06 NOTE — PCM.PSYCH
Initial Psychiatric Evaluation





- Initial Psychiatric Evaluation


Type of Admission: Voluntary


Legal Status: Capacity


Chief Complaint (in patient's own words): 





I'm feeling depressed and suicidal"


History of Present Illness and Precipitating Events: 





The pt is seen, chart reviewed, case discussed with staff





Pt is a 26y/o  female with a history of Schizoaffective Disorder

, came to the ED for intrusive suicidal thoughts, increasing paranoia, and 

persecutory delusions. 





Per ED charts: pt stated that she previously attended the partial 

hospitalization program through [facility], and recently 

learned of the death by suicide of a friend she made there. Pt stated that in 

addition to learning of her friend's death, she has been feeling increasingly 

paranoid, feels like people are "following me and want to hurt me." Pt stated 

that she is afraid that she may do something to harm herself due to worsening 

symptoms. Pt denied previous suicide attempts in the past. Pt denied H/I and A/V

/T hallucinations, but has a history of auditory hallucinations in the past. Pt 

is not able to state the last time she experienced auditory hallucinations, but 

states the "medicine helps them stay away." Pt states that she has been 

diagnosed with schizoaffective disorder, and her last hospitalization was in 12/ 2016 at [address] for similar symptoms. Pt reports that she attends a 

day-program at [facility] x4 days/week, where she receives 

therapy and medication management. Pt states she is prescribed and compliant 

with the following medications: Cogentin 1mg, Buspar 10mg, Celexa 20mg, Invega 

234mg and 17mg injection. Pt states that she feels like the Celexa is not 

helping her, and would like to be switched to Wellbutrin as she feels that in 

the past this was more effective with depressive symptoms.





Pt appears to have a flat affect and soft speech during the interview. Patient 

reports depressed and irritable mood, reports feelings of hopelessness and 

helplessness. She denies any AVH, however, she remained disorganized and 

appeared delusional. 





A list of the names and number of her social workers was collected during the 

interview and the pt has them written on paper. 





Past MHX: Anemia





Past Psychiatric HX: Schizoaffective Disorder





Past Family Psychiatric HX: Mother- schizophrenia Uncle-Schizophrenia 





Past Family Substance Abuse: Father received help for Marijuana use





Current Medications: 





Active Medications











Generic Name Dose Route Start Last Admin





  Trade Name Kel  PRN Reason Stop Dose Admin


 


Benztropine Mesylate  1 mg  12/06/17 10:00  12/06/17 10:44





  Cogentin  PO   1 mg





  DAILY IMER   Administration


 


Buspirone HCl  10 mg  12/06/17 10:00  12/06/17 10:42





  Buspar  PO   10 mg





  BID IMER   Administration


 


Citalopram Hydrobromide  20 mg  12/06/17 11:30  





  Celexa  PO   





  DAILY IMER   


 


Ferrous Sulfate  325 mg  12/06/17 10:00  12/06/17 10:43





  Feosol  PO   325 mg





  DAILY IMER   Administration


 


Pneumococcal Polyvalent Vaccine  0.5 ml  12/08/17 10:00  





  Pneumovax 23 Vaccine  IM  12/08/17 10:01  





  .ONCE ONE   


 


Risperidone  1 mg  12/06/17 11:30  





  Risperdal Tab  PO   





  BID IMER   


 


Trazodone HCl  50 mg  12/05/17 22:00  12/05/17 21:48





  Desyrel  PO   50 mg





  HS IMER   Administration














Past Psychiatric History





- Past Psychiatric History


Previous Treatment History: Inpatient


Pertinent Medical Hx (Current Medical&Sleep Prob, Allergies): 





 Allergies











Allergy/AdvReac Type Severity Reaction Status Date / Time


 


haloperidol [From Haldol] Allergy  SWELLING Verified 12/06/17 05:37








 





Benztropine [Benztropine Mesylate] 1 mg PO DAILY 12/14/16 


busPIRone [Buspar] 10 mg PO DAILY 12/14/16 


Citalopram [celeXA] 20 mg PO DAILY 12/05/17 











Review of Systems





- Review of Systems


All systems: reviewed and no additional remarkable complaints except





- Psychiatric


Psychiatric: Anxiety, Behavioral Changes, Depression, Difficulty Concentrating, 

Irritability, Paranoia, Suicidal Ideation





Mental Status Examination





- Personal Presentation


Personal Presentation: Looks stated age





- Affect


Affect: Flat, Depressed





- Motor Activity


Motor Activity: Calm, Psychomotor Retardation





- Reliability in Providing Information


Reliability in Providing Information: Poor, due to alteration in thoughts





- Speech


Speech: Disorganized





- Mood


Mood: Depressed, Anxious





- Formal Thought Process


Formal Thought Process: Delusions, Paranoia, Loosening of associations





- Hallucinations/Delusions


Delusions: Other (People Following her)





- Obsessions/Compulsions


Obsessions: No


Compulsions: No





- Cognitive Functions


Orientation: Person, Place, Situation, Time


Sensorium: Alert


Attention/Concentration: Attentive


Abstract Thinking: [place]


Estimate of Intelligence: Below average


Judgement: Imparied, as evidence by: Poor judgement, Imparied, as evidence by: 

Lack of insight into illness





- Risk


Risk: Suicidal, Diminished functioning





DSM 5 DX





- DSM 5


DSM 5 Diagnosis: 





Schizoaffective disorder depressed type





- Recommended/Plan of Treatment


Treatment Recommendations and Plan of Treatment: 








Schizoaffective disorder depressed type





CBT


Psychoeducation


Supportive therapy, group therapy, individual therapy


Risperdal 1 mg PO BID 


Celexa 20 mg PO Daily


Buspar 10 mg PO BID


Trazodone 50 mg by mouth daily at bedtime 





- Smoking Cessation


Smoking Cessation Initiated: No

## 2017-12-07 NOTE — PCM.PYCHPN
Psychiatric Progress Note





- Psychiatric Progress Note


Patient seen today, length of contact: 17


Patient Chief Complaint: 





"I'm still depressed"


Problems Identified/Issues Discussed: 





This patient was seen, chart reviewed, and case discussed with staff. 





Patient reports that her sleep was good. Pt states that her appetite has 

decreased. She states that she is still experiencing depressed mood, but it is 

not as bad yesterday. She reports feeling anxious. Pt reports feelings of 

paranoia and the thought that "people want her to hurt herself." Pt was 

counseled on those thoughts and comforted. She denies suicidal ideations and 

homicidal ideations.  She denies any auditory or visual hallucination. Patient 

appears slightly disheveled and has a soft speech pattern. She is cooperative. 

Pt has been participating in groups and has been interacting with the staff and 

other patients. 





Patient is compliant with medications and denies any side effects. 


Symptoms are improving but need more time to stabilize. 


Support and psychoeducation given. 





Mental Status Examination





- Cognitive Function


Orientation: Person, Place, Situation, Time


Attention: WNL


Concentration: WNL


Association: Loose


Fund of Knowledge: Poor





- Mood


Mood: Depressed





- Affect


Affect: Flat, Depressed





- Formal Thought Process


Formal Thought Process: Delusions, Paranoia, Loosening of associations





- Suicidal Ideation


Suicidal Ideation: No





- Homicidal Ideation


Homicidal Ideation: No





Goal/Treatment Plan





- Goal/Treatment Plan


Need for Continued Stay: Severe depression anxiety, Discharge may exacerbated 

symptoms, Severe functional impairment


Progress Toward Problem(s) and Goals/Treatment Plan: 








Schizoaffective disorder depressed type





CBT


Psychoeducation


Supportive therapy, group therapy, individual therapy


Risperdal 1 mg PO BID 


Celexa 20 mg PO Daily


Buspar 10 mg PO BID


Trazodone 50 mg by mouth daily at bedtime

## 2017-12-08 NOTE — PCM.PYCHPN
Psychiatric Progress Note





- Psychiatric Progress Note


Patient seen today, length of contact: 16 min


Patient Chief Complaint: 





"I'm still depressed"


Problems Identified/Issues Discussed: 





This patient was seen, chart reviewed, and case discussed with staff. 





Patient appears more organized and less paranoid and less delusional. However, 

she remained isolated, confined and withdrawn. She still appears mildly 

paranoid and delusional and remained disheveled and unkempt. Pt reports 

feelings of paranoia and the thought that "people want her to hurt herself." 

She still reports depressed mood but reports improvement in her feelings of 

hopelessness and helplessness. She is taking medication and denies any side 

effects.





Symptoms are improving but need more time to stabilize. 


Support and psychoeducation given. 


Medication Change: Yes (increase Celexa)


Medical Record Reviewed: Yes





Mental Status Examination





- Cognitive Function


Orientation: Person, Place, Situation, Time


Attention: Poor


Concentration: WNL


Association: Loose


Fund of Knowledge: Poor





- Mood


Mood: Depressed, Anxious





- Affect


Affect: Flat, Depressed





- Speech


Speech: Soft





- Formal Thought Process


Formal Thought Process: Delusions, Paranoia, Loosening of associations





- Suicidal Ideation


Suicidal Ideation: No





- Homicidal Ideation


Homicidal Ideation: No





Goal/Treatment Plan





- Goal/Treatment Plan


Need for Continued Stay: Severe depression anxiety, Discharge may exacerbated 

symptoms, Severe functional impairment, Other


Progress Toward Problem(s) and Goals/Treatment Plan: 








Schizoaffective disorder depressed type





CBT


Psychoeducation


Supportive therapy, group therapy, individual therapy


Risperdal 1 mg PO BID 


Celexa 40 mg PO Daily


Buspar 10 mg PO BID


Trazodone 50 mg by mouth daily at bedtime 





- Smoking Cessation


Smoking Cessation Initiated: No

## 2017-12-09 NOTE — PCM.PYCHPN
Psychiatric Progress Note





- Psychiatric Progress Note


Patient seen today, length of contact: 16 min


Patient Chief Complaint: 





"I'm still depressed"


Problems Identified/Issues Discussed: 





This patient was seen, chart reviewed, and case discussed with staff. Patient 

appears more organized and less paranoid and less delusional. However, she 

remained isolated, confined and withdrawn. She still appears mildly paranoid 

and delusional and remained disheveled and unkempt. 





She still reports depressed mood but reports improvement in her feelings of 

hopelessness and helplessness. 





She is taking medication and denies any side effects.





Symptoms are improving but need more time to stabilize. 


Support and psychoeducation given. 


Medication Change: Yes (increase risperdal)


Medical Record Reviewed: Yes





Mental Status Examination





- Cognitive Function


Orientation: Person, Place, Situation, Time


Attention: Poor


Concentration: WNL


Association: Loose


Fund of Knowledge: Poor





- Mood


Mood: Depressed, Anxious





- Affect


Affect: Flat, Depressed





- Formal Thought Process


Formal Thought Process: Delusions, Paranoia, Loosening of associations





- Suicidal Ideation


Suicidal Ideation: No





- Homicidal Ideation


Homicidal Ideation: No





Goal/Treatment Plan





- Goal/Treatment Plan


Need for Continued Stay: Severe depression anxiety, Discharge may exacerbated 

symptoms, Severe functional impairment


Progress Toward Problem(s) and Goals/Treatment Plan: 








Schizoaffective disorder depressed type





CBT


Psychoeducation


Supportive therapy, group therapy, individual therapy


Risperdal 1 mg PO Daily


Risperdal 2 gm PO QHS


Celexa 40 mg PO Daily


Buspar 10 mg PO BID


Trazodone 50 mg by mouth daily at bedtime 





- Smoking Cessation


Smoking Cessation Initiated: No

## 2017-12-10 NOTE — PCM.PYCHPN
Psychiatric Progress Note





- Psychiatric Progress Note


Patient seen today, length of contact: 15 minutes


Patient Chief Complaint: 





I am feeling better.  Not hearing voices and my sleep is also better.


Problems Identified/Issues Discussed: 





Patient seen, chart reviewed, case discussed with the staff.





Issues related to illness and treatment were discussed with the patient and 

staff.





Reported compliant with treatment with no adverse effects.





Feeling much better,has better sleep,no hallucinations.





Aftercare discussed with the patient.





Patient was awake, alert and oriented 3.





Denied any delusions, auditory or visual hallucinations, suicidal ideations or 

homicidal ideations at the time of evaluation..


Diagnostic Results: 





Reviewed


DSM 5 Symptoms Update: 





Improving with treatment


Medication Change: No


Medical Record Reviewed: Yes





Mental Status Examination





- Cognitive Function


Orientation: Person, Place, Situation, Time


Memory: Intact


Attention: WNL


Concentration: WNL


Association: WNL


Fund of Knowledge: WNL





- Mood


Mood: Depressed (Much less than before)





- Affect


Affect: Depressed





- Speech


Speech: Appropriate





- Formal Thought Process


Formal Thought Process: No Impairment





- Suicidal Ideation


Suicidal Ideation: No





- Homicidal Ideation


Homicidal Ideation: No





Goal/Treatment Plan





- Goal/Treatment Plan


Need for Continued Stay: Remain at risks for inpatient hospitalization, 

Discharge may exacerbated symptoms, Severe functional impairment


Progress Toward Problem(s) and Goals/Treatment Plan: 





Patient education


Supportive therapy


Continue treatment as before


Patient will go to [facility] after discharge from the hospital for 

follow-up care.


Estimated Date of D/C: 12/13/17





- Smoking Cessation


Smoking Cessation Initiated: No

## 2017-12-11 NOTE — PCM.PYCHPN
Psychiatric Progress Note





- Psychiatric Progress Note


Patient seen today, length of contact: 15 minutes


Patient Chief Complaint: 





"I'm still depressed"


Problems Identified/Issues Discussed: 





This patient was seen, chart reviewed, and case discussed with staff. Patient 

appears more organized and less paranoid and less delusional. However, she 

remained isolated, confined and withdrawn. She still appears mildly paranoid 

and delusional and remained disheveled and unkempt. 





She still reports depressed mood but reports improvement in her feelings of 

hopelessness and helplessness. 





She is taking medication and denies any side effects.





Symptoms are improving but need more time to stabilize. 


Support and psychoeducation given. 


Medication Change: No


Medical Record Reviewed: Yes





Mental Status Examination





- Cognitive Function


Orientation: Person, Place, Situation, Time


Memory: Intact


Attention: WNL


Concentration: WNL


Association: WNL


Fund of Knowledge: WNL





- Mood


Mood: Depressed (Much less than before)





- Affect


Affect: Depressed





- Speech


Speech: Appropriate





- Formal Thought Process


Formal Thought Process: No Impairment





- Suicidal Ideation


Suicidal Ideation: No





- Homicidal Ideation


Homicidal Ideation: No





Goal/Treatment Plan





- Goal/Treatment Plan


Need for Continued Stay: Remain at risks for inpatient hospitalization, 

Discharge may exacerbated symptoms, Severe functional impairment


Progress Toward Problem(s) and Goals/Treatment Plan: 








Schizoaffective disorder depressed type





CBT


Psychoeducation


Supportive therapy, group therapy, individual therapy


Risperdal 1 mg PO Daily


Risperdal 2 gm PO QHS


Celexa 40 mg PO Daily


Buspar 10 mg PO BID


Trazodone 50 mg by mouth daily at bedtime 


Estimated Date of D/C: 12/13/17

## 2017-12-14 NOTE — PCM.PYCHPN
Psychiatric Progress Note





- Psychiatric Progress Note


Patient seen today, length of contact: 15 minutes


Patient Chief Complaint: 





"I'm still depressed"


Problems Identified/Issues Discussed: 





This patient was seen, chart reviewed, and case discussed with staff. Patient 

reports improvement in her mood and paranoia. She denies any hallucinations and 

any delusions. However, she remained isolated, confined and withdrawn.





She reports improvement in her feelings of hopelessness and helplessness. 





She is taking medication and denies any side effects.





Symptoms are improving but need more time to stabilize. 


Support and psychoeducation given. 


Medication Change: Yes (increase risperdal)


Medical Record Reviewed: Yes





Mental Status Examination





- Cognitive Function


Orientation: Person, Place, Situation, Time


Memory: Intact


Attention: WNL


Concentration: WNL


Association: WNL


Fund of Knowledge: WNL





- Mood


Mood: Depressed (Much less than before)





- Affect


Affect: Depressed





- Speech


Speech: Appropriate





- Formal Thought Process


Formal Thought Process: No Impairment





- Suicidal Ideation


Suicidal Ideation: No





- Homicidal Ideation


Homicidal Ideation: No





Goal/Treatment Plan





- Goal/Treatment Plan


Need for Continued Stay: Remain at risks for inpatient hospitalization


Progress Toward Problem(s) and Goals/Treatment Plan: 








Schizoaffective disorder depressed type





CBT


Psychoeducation


Supportive therapy, group therapy, individual therapy


Risperdal 2 mg PO Daily


Risperdal 2 gm PO QHS


Celexa 40 mg PO Daily


Buspar 10 mg PO BID


Trazodone 50 mg by mouth daily at bedtime 


Estimated Date of D/C: 12/13/17





- Smoking Cessation


Smoking Cessation Initiated: No

## 2017-12-15 VITALS
DIASTOLIC BLOOD PRESSURE: 84 MMHG | OXYGEN SATURATION: 99 % | HEART RATE: 106 BPM | SYSTOLIC BLOOD PRESSURE: 135 MMHG | RESPIRATION RATE: 18 BRPM | TEMPERATURE: 97 F

## 2017-12-15 NOTE — PCM.PYCHDC
Mental Status Examination





- Mental Status Examination


Orientation: Person, Place, Situation, Time


Memory: Intact


Mood: Neutral


Affect: Constricted


Speech: Soft


Attention: WNL


Concentration: WNL


Association: WNL


Fund of Knowledge: WNL


Formal Thought Process: No Impairment


Description of patient's judgement and insight: 





GOOD, FAIR


Psychotic Thoughts and Behaviors: 





denies any AVH


Suicidal Ideation: No


Current Homicidal Ideation?: No





Discharge Summary





- Discharge Note


Reason for Hospitalization: 








The pt is seen, chart reviewed, case discussed with staff





Pt is a 28y/o  female with a history of Schizoaffective Disorder

, came to the ED for intrusive suicidal thoughts, increasing paranoia, and 

persecutory delusions. 





Per ED charts: pt stated that she previously attended the partial 

hospitalization program through [facility], and recently 

learned of the death by suicide of a friend she made there. Pt stated that in 

addition to learning of her friend's death, she has been feeling increasingly 

paranoid, feels like people are "following me and want to hurt me." Pt stated 

that she is afraid that she may do something to harm herself due to worsening 

symptoms. Pt denied previous suicide attempts in the past. Pt denied H/I and A/V

/T hallucinations, but has a history of auditory hallucinations in the past. Pt 

is not able to state the last time she experienced auditory hallucinations, but 

states the "medicine helps them stay away." Pt states that she has been 

diagnosed with schizoaffective disorder, and her last hospitalization was in 12/ 2016 at [address] for similar symptoms. Pt reports that she attends a 

day-program at [facility] x4 days/week, where she receives 

therapy and medication management. Pt states she is prescribed and compliant 

with the following medications: Cogentin 1mg, Buspar 10mg, Celexa 20mg, Invega 

234mg and 17mg injection. Pt states that she feels like the Celexa is not 

helping her, and would like to be switched to Wellbutrin as she feels that in 

the past this was more effective with depressive symptoms.





Pt appears to have a flat affect and soft speech during the interview. Patient 

reports depressed and irritable mood, reports feelings of hopelessness and 

helplessness. She denies any AVH, however, she remained disorganized and 

appeared delusional. 





Consultations:: List each consultation separately and include:  1. Reason for 

request.  2. Findings.  3. Follow-up


Summary of Hospital Course include:: 1. Description of specific treatment plan 

utilized for patients during their course of treatmen.  2. Summarize the time-

course for resolution of acute symptoms and/or regressed behaviors.  3. 

Describe issues identified and worked on during hospitalization.  4. Describe 

medication utilized.  5. Describe medical problems identified and treated.  6. 

Reassessment of suicide risk


Summary of Hospital Course: 


During the course of her stay, patient (pt) started progressively improving and 

no longer remained irritable, depressed, and paranoid. Her mood and paranoia 

were improved and she started attending groups and meetings and started 

socializing. Patient denied any feelings of hopelessness, helplessness, and 

worthlessness, denied any problem with the sleep or appetite, denied suicidal 

ideation or homicidal ideation. Pt denied any auditory or visual 

hallucinations.  





Some changes were made in her current medications and patient was discharged on 

following medications. She tolerated these medications very well and denied any 

side effects.  Pt is to return to [facility] in [place] on 12/18/17.








- Diagnosis


(1) Schizoaffective disorder


Status: Acute   





- Final Diagnosis (DSM 5)


Condition upon Discharge: FAIR


DSM 5: 





Schizoaffective disorder depressed type





Disposition: HOME/ ROUTINE


Follow-up Treatment Plan: 





Education:


Pt was educated and counseled about the risks and benefits of taking and not 

taking medications.  





Pt was educated and counseled about the risks of drinking and abusing drugs.   





Pt was educated and counseled to go to the ER or call 911 if pt develop 

suicidal ideation or homicidal ideation, worsening of symptoms or severe side 

effects of the meds.


Prescriptions/Medication Reconciliation: 


Benztropine [Cogentin] 1 mg PO BID #60 tab


busPIRone [Buspar] 10 mg PO BID #60 tab


Citalopram [celEXA] 40 mg PO DAILY #30 tab


Mirtazapine [Remeron] 30 mg PO HS #30 tab


risperiDONE [RisperDAL Tab] 2 mg PO BID #60 tab


traZODone [Desyrel] 50 mg PO HS #30 tab





- Smoking Cessation


Smoking Cessation Medication prescribed: No





- Antipsychotic Medications


Pt discharged on 2 or more routine antipsychotic medications: No

## 2017-12-19 ENCOUNTER — HOSPITAL ENCOUNTER (INPATIENT)
Dept: HOSPITAL 14 - H.ER | Age: 27
LOS: 17 days | Discharge: HOME | DRG: 430 | End: 2018-01-05
Attending: PSYCHIATRY & NEUROLOGY | Admitting: PSYCHIATRY & NEUROLOGY
Payer: MEDICAID

## 2017-12-19 DIAGNOSIS — R00.0: ICD-10-CM

## 2017-12-19 DIAGNOSIS — F25.9: Primary | ICD-10-CM

## 2017-12-19 DIAGNOSIS — F32.9: ICD-10-CM

## 2017-12-19 DIAGNOSIS — F41.9: ICD-10-CM

## 2017-12-19 DIAGNOSIS — R45.851: ICD-10-CM

## 2017-12-19 LAB
ALBUMIN SERPL-MCNC: 4.5 G/DL (ref 3.5–5)
ALBUMIN/GLOB SERPL: 1.2 {RATIO} (ref 1–2.1)
ALT SERPL-CCNC: 27 U/L (ref 9–52)
AST SERPL-CCNC: 17 U/L (ref 14–36)
BUN SERPL-MCNC: 10 MG/DL (ref 7–17)
CALCIUM SERPL-MCNC: 9.3 MG/DL (ref 8.4–10.2)
ERYTHROCYTE [DISTWIDTH] IN BLOOD BY AUTOMATED COUNT: 13.1 % (ref 11.5–14.5)
GFR NON-AFRICAN AMERICAN: > 60
HGB BLD-MCNC: 12.3 G/DL (ref 12–16)
MCH RBC QN AUTO: 30.3 PG (ref 27–31)
MCHC RBC AUTO-ENTMCNC: 33.1 G/DL (ref 33–37)
MCV RBC AUTO: 91.4 FL (ref 81–99)
PLATELET # BLD: 256 K/UL (ref 130–400)
RBC # BLD AUTO: 4.07 MIL/UL (ref 3.8–5.2)
WBC # BLD AUTO: 6.8 K/UL (ref 4.8–10.8)

## 2017-12-19 PROCEDURE — GZ58ZZZ INDIVIDUAL PSYCHOTHERAPY, COGNITIVE-BEHAVIORAL: ICD-10-PCS | Performed by: PSYCHIATRY & NEUROLOGY

## 2017-12-19 PROCEDURE — GZHZZZZ GROUP PSYCHOTHERAPY: ICD-10-PCS | Performed by: PSYCHIATRY & NEUROLOGY

## 2017-12-19 PROCEDURE — GZ56ZZZ INDIVIDUAL PSYCHOTHERAPY, SUPPORTIVE: ICD-10-PCS | Performed by: PSYCHIATRY & NEUROLOGY

## 2017-12-19 NOTE — ED PDOC
HPI: Psych/Substance Abuse


Time Seen by Provider: 17 13:37


Chief Complaint (Nursing): Psychiatric Evaluation


Chief Complaint (Provider): "I feel paranoid"


History Per: Patient


History/Exam Limitations: no limitations


Onset/Duration Of Symptoms: Days


Current Symptoms Are (Timing): Still Present


Additional Complaint(s): 





Pt states she was in partial care and her  wanted her to come to the 

ER and be evaluated. Pt reports taking medication as prescribed. Pt denies SI/

HI. Pt states she is feeling paranoid at school and work. 





Past Medical History


Reviewed: Historical Data, Nursing Documentation, Vital Signs


Vital Signs: 





 Last Vital Signs











Temp  98.7 F   17 13:23


 


Pulse  101 H  17 13:23


 


Resp  16   17 13:23


 


BP  144/80   17 13:23


 


Pulse Ox  98   17 13:23














- Medical History


PMH: Anemia, Anxiety, Bronchitis, Depression, Schizophrenia


   Denies: Chronic Kidney Disease





- Surgical History


Surgical History: No Surg Hx





- Family History


Family History: States: No Known Family Hx





- Living Arrangements


Living Arrangements: With Family





- Social History


Current smoker - smoking cessation education provided: No


Alcohol: None


Drugs: Denies





- Home Medications


Home Medications: 


 Ambulatory Orders











 Medication  Instructions  Recorded


 


Azithromycin [Zithromax] 250 mg PO DAILY #3 tab 17


 


Benztropine [Cogentin] 1 mg PO BID #60 tab 17


 


Citalopram [celEXA] 20 mg PO DAILY #30 tab 17


 


Divalproex [Depakote DR(*BID*)] 500 mg PO BID #60 tcp 17


 


Fluticasone Propionate [Flonase] 2 spr UMAIR DAILY #1 bottle 17


 


busPIRone [Buspar] 10 mg PO TID #90 tab 17


 


risperiDONE [RisperDAL Tab] 1 mg PO DAILY #30 tab 17














- Allergies


Allergies/Adverse Reactions: 


 Allergies











Allergy/AdvReac Type Severity Reaction Status Date / Time


 


haloperidol [From Haldol] Allergy  ANGIOEDEMA Verified 17 13:23














Review of Systems


ROS Statement: Except As Marked, All Systems Reviewed And Found Negative


Constitutional: Negative for: Fever, Chills


Psych: Positive for: Psychosis.  Negative for: Anxiety, Suicidal ideation





Physical Exam





- Reviewed


Nursing Documentation Reviewed: Yes


Vital Signs Reviewed: Yes





- Physical Exam


Appears: Positive for: Well, Non-toxic, No Acute Distress


Head Exam: Positive for: ATRAUMATIC, NORMAL INSPECTION, NORMOCEPHALIC


Skin: Positive for: Normal Color, Warm, DRY


Eye Exam: Positive for: Normal appearance


ENT: Positive for: Normal ENT Inspection


Neck: Positive for: Normal, Painless ROM


Cardiovascular/Chest: Positive for: Regular Rate, Rhythm


Respiratory: Positive for: CNT, Normal Breath Sounds


Gastrointestinal/Abdominal: Positive for: Normal Exam, Bowel Sounds, Soft


Back: Positive for: Normal Inspection


Extremity: Positive for: Normal ROM


Neurologic/Psych: Positive for: Alert, Oriented





- Laboratory Results


Result Diagrams: 


 17 14:40





 17 14:40





- ECG


O2 Sat by Pulse Oximetry: 98





Medical Decision Making


Medical Decision Makin - Crisis aware. 


1740 - Crisis in room evaluation patient. 





Pt admitted by patient for evaluation. 





Disposition





- Clinical Impression


Clinical Impression: 


 Schizophrenia








- Patient ED Disposition


Is Patient to be Admitted: Yes





- Disposition


Disposition Time: 18:34


Condition: STABLE





- Pt Status Changed To:


Hospital Disposition Of: Inpatient





- Admit Certification


Admit to Inpatient:: After my assessment, the patient will require 

hospitalization for at least two midnights.  This is because of the severity of 

symptoms shown, intensity of services needed, and/or the medical risk in this 

patient being treated as an outpatient.





- POA


Present On Arrival: None

## 2017-12-20 LAB
HDLC SERPL-MCNC: 59 MG/DL (ref 30–70)
LDLC SERPL-MCNC: 72 MG/DL (ref 0–129)
T4 SERPL-MCNC: 8.92 UG/DL (ref 5.5–11)

## 2017-12-20 NOTE — PCM.BM
<Prema Brown - Last Filed: 12/20/17 02:09>





Treatment Plan Problems





- Problems identified on initial assessmt


  ** Delusions


Date Initiated: 12/20/17


Time Initiated: 02:09


Assessment reference: NA


Status: Active





  ** Auditory Hallucinations


Date Initiated: 12/20/17


Time Initiated: 02:09


Assessment reference: NA


Status: Active





Treatment assets and liabiliti


Patient Assests: cooperative, ADL independent, physically healthy, negotiates 

basic needs


Patient Liabilities: live alone, poor support system





- Milieu Protocol


Maintain good personal hygiene: daily Encourage regular showers, every shift 

Remind patient to perform daily oral care


Maintain personal safety: every shift Educate patient to report safety concerns 

to staff, every shift Monitor environment for contraband/sharps


Medication safety: Monitor for expected outcome, potential side effects: every 

shift, Assess barriers to learning: every shift, Assess readiness for 

medication education: every shift





<Thuy Whelan - Last Filed: 12/21/17 15:48>





Treatment assets and liabiliti


Patient Assests: adapts well, cooperative, ADL independent, physically healthy, 

good support system, negotiates basic needs


Patient Liabilities: poor support system





Family Contact


Family involvement: Family/SO is involved


Family contact: Patient agrees to contact, Family has been contacted by patient

, Telephone contact initiated by staff


Family contact name: Smooth(aunt)([phone])


Family contacted how many times per week?: 2


Family contact comment: Writer placed call to patients aunt (Smooth [phone]

) to discuss precursors to hospitalization and progress on 3NP. Patients aunt 

reports that she checks in with patient frequently but has been unable to visit 

recently due to having to care for patients grandmother. Patients aunt reports 

that patient used to reside with her but started to get threatening towards her 

children and had to find alternate housing. Smooth expressed concerns regarding 

possibility of patient harming herself or others while decompensated. As per 

patients aunt patient has hx of being inconsistently compliant with 

medications. Smooth requested psychoeducation regarding mental illness and 

medication management. Psychoeducation provided. Writer emphasized importance 

of compliance with aftercare to reduce risk of future hospitalizations and 

improve functioning in the community. Writer notified Smooth that patient will 

be placed on a 1:1 on 3NP secondary to inabiliy to contract for saftey at 

present time. Patients aunt expressed understanding of the above. Writer 

provided unit payphones and visitation hours. Writer to continue providing 

clinical updates through-out patients hospitalization.





- Outside Agency


  ** Agency 1


Care involvment: Following patient during stay, Other


Agency contact name: [facility]


Agency contact number: [phone]





  ** Agency 2


Care involvment: Following patient during stay, Other


Agency contact name: [facility]


Agency contact number: [phone]





- Goals for Treatment


Patient goals for treatment: Patient to continue stabilization on 3NP through 

medication management and group/supportive therapy. Patient to be encouraged to 

attend groups regularly to promote self-awareness, compliance, and improve 

insight, coping skills and self-esteem. Patient to be provided with referral 

for appropriate level of aftercare to reduce risk of future hospitalizations 

and ensure safety in the community.





Discharge/Continuing Care





- Education Needs


Education Needs: Family Medication, Family Coping Skills, Family Community 

resources, Family Aftercare Safety Plan, Patient Medication, Patient Coping 

Skills, Patient Community resources, Patient Aftercare Safety Plan





- Discharge


Discharge Criteria: Tolerates medication w/o severe side effects, Free of 

Suicidal thoughts, Free of Homicidal thoughts, Free of paranoid thoughts, Free 

of agitation, Normal sleep pattern, Ability to care for self, Reduction of 

target symptoms


Discharge to:: Home, Other





<Monica Cornejo - Last Filed: 12/22/17 12:00>





- Diagnosis


(1) Schizoaffective disorder


Status: Acute   


Interventions: 





12/22/17 12:00


psychotherapy


pharmacotherapy

## 2017-12-20 NOTE — PCM.PSYCH
Initial Psychiatric Evaluation





- Initial Psychiatric Evaluation


Type of Admission: Voluntary


Legal Status: Capacity


Chief Complaint (in patient's own words): 





i hear voices telling me to stop


Patient's Reaction to Hospitalization: 





pt requested help


History of Present Illness and Precipitating Events: 


pt. is a 27 year old AA single female previous diagnosis of schizoaffective 

disorder, discharged few days ago from [facility]. Pt. reported coming to 

ER because her worker told her to come here. Pt. reported being paranoid, 

having thoughts of being followed and persecutted from people who worked with 

her because they believe she killed her mother. Pt. is fearful she would hurt 

herself due to these intruding thoughts. Pt. also reported non command auditory 

hallucinations making her anxious, pt requested admission for medication 

stabilization





Current Medications: 





Active Medications











Generic Name Dose Route Start Last Admin





  Trade Name Freq  PRN Reason Stop Dose Admin


 


Acetaminophen  650 mg  12/19/17 23:04  





  Tylenol 325mg Tab  PO   





  Q4 PRN   





  pain level 3 to 7   


 


Al Hydrox/Mg Hydrox/Simethicone  30 ml  12/19/17 23:04  





  Maalox Plus 30 Ml  PO   





  Q4 PRN   





  Dyspepsia   


 


Benztropine Mesylate  0.5 mg  12/20/17 09:00  12/20/17 12:42





  Cogentin  PO   0.5 mg





  DAILY JOSE   Administration


 


Diphenhydramine HCl  50 mg  12/19/17 23:04  





  Benadryl  IM   





  Q6 PRN   





  Extrapyramidal S/S Unable PO   


 


Diphenhydramine HCl  50 mg  12/19/17 23:04  





  Benadryl  PO   





  Q6 PRN   





  Extrapyramidal Symptoms   


 


Diphenhydramine HCl  50 mg  12/19/17 23:08  





  Benadryl  PO   





  HS PRN   





  Sleep   


 


Lorazepam  2 mg  12/19/17 23:04  





  Ativan  IM   





  Q4 PRN   





  Anxiety/Agitation,Unable PO   


 


Lorazepam  2 mg  12/19/17 23:04  





  Ativan  PO   





  Q4 PRN   





  Anxiety/Agitation   


 


Magnesium Hydroxide  30 ml  12/19/17 23:04  





  Milk Of Magnesia  PO   





  HS PRN   





  Constipation   


 


Mirtazapine  30 mg  12/19/17 22:00  12/19/17 23:56





  Remeron  PO   30 mg





  HS JOSE   Administration


 


Risperidone  2 mg  12/20/17 09:00  12/20/17 12:43





  Risperdal Tab  PO   2 mg





  BID JOSE   Administration


 


Trazodone HCl  50 mg  12/19/17 22:00  12/19/17 23:56





  Desyrel  PO   50 mg





  HS JOSE   Administration














Past Psychiatric History





- Past Psychiatric History


Previous Treatment History: Inpatient


Explanation of prior treatment: 





multiple inpatient hospitalizations for psychotic symptoms


History of Abuse: 





denied


History of ETOH/Drug Use: 





denied


History of Family Illness: 





denied


Pertinent Medical Hx (Current Medical&Sleep Prob, Allergies): 





 Allergies











Allergy/AdvReac Type Severity Reaction Status Date / Time


 


haloperidol [From Haldol] Allergy  ANGIOEDEMA Verified 12/19/17 13:23








 





Benztropine [Cogentin] 0.5 mg PO DAILY 12/19/17 


Citalopram Hydrobromide [Citalopram HBr] 40 mg PO DAILY 12/19/17 


Mirtazapine [Remeron] 30 mg PO HS 12/19/17 


Risperidone [Risperdal] 2 mg PO Q12H 12/19/17 


busPIRone [Buspar] 10 mg PO Q12H 12/19/17 


traZODone [Desyrel] 50 mg PO HS 12/19/17 











Mental Status Examination





- Personal Presentation


Personal Presentation: Looks older than stated age


Additional comments: 





unkempt





- Affect


Affect: Constricted, Blunted





- Motor Activity


Motor Activity: Psychomotor Retardation





- Reliability in Providing Information


Reliability in Providing Information: Poor, due to alteration in thoughts, Poor

, due to altered mood





- Speech


Speech: Disorganized





- Mood


Mood: Anxious





- Formal Thought Process


Formal Thought Process: Delusions, Paranoia, Circumstantial





- Hallucinations/Delusions


Hallucinations: Auditory


Additional comments: 





pt reported non command auditory hallucinations





- Obsessions/Compulsions


Obsessions: No


Compulsions: No





- Cognitive Functions


Orientation: Person, Place


Sensorium: Alert


Attention/Concentration: Easily distracted


Abstract Thinking: Delta


Judgement: Imparied, as evidence by: Poor judgement, Imparied, as evidence by: 

Lack of insight into illness





- Risk


Risk: Diminished functioning





- Strength & Assets Inventory


Strength & Assets Inventory: Life experience





- Limitations


Additional comments: 





poor social support





DSM 5 DX





- DSM 5


DSM 5 Diagnosis: 





schizoaffectine disorder bipolar





- Recommended/Plan of Treatment


Treatment Recommendations and Plan of Treatment: 





start risperidone 1mg bid


will attaempt to change pt from invega to risperidone consta 


group and supportive therapy


Projected ELOS: 7 days


Prognosis: guarded


Discharge Plan and Discharge Criteria: 





pt no longer disorganized

## 2017-12-21 NOTE — CP.PCM.CON
<Karon Malcolm - Last Filed: 12/21/17 13:45>





History of Present Illness





- History of Present Illness


History of Present Illness: 








HPI: Patient is a 28 y/o female with Schitzoaffective disorder who presented to 

the hospital because she was having paranoid thoughts and auditory 

hallucinations that she believes may have led her to harm herself or others. 

Patient denies suicidal ideations or attempts but states that she was pressured 

to kill herself by these voices and was advised by her  to go to 

the hospital. Pt states that she has been compliant with all of her 

medications. 





PMD: Elias Nicole 





PMHX: Schitzoaffective Disorder





PSurgical Hx: Denies





Allergies: Haloperidol





Family Hx: Uncle- Schitzophrenia





Social: Lives in group home, denies alcohol use, smoking, or illicit drug use











Past Patient History





- Infectious Disease


Hx of Infectious Diseases: None





- Tetanus Immunizations


Tetanus Immunization: Unknown





- Past Medical History & Family History


Past Medical History?: No





- Past Social History


Smoking Status: Never Smoked





- CARDIAC


Hx Cardiac Disorders: No





- PULMONARY


Hx Respiratory Disorders: Yes


Hx Bronchitis: Yes





- NEUROLOGICAL


Hx Neurological Disorder: No





- HEENT


Hx HEENT Problems: No





- RENAL


Hx Chronic Kidney Disease: No





- ENDOCRINE/METABOLIC


Hx Endocrine Disorders: No





- HEMATOLOGICAL/ONCOLOGICAL


Hx Blood Disorders: Yes


Hx Anemia: Yes





- INTEGUMENTARY


Hx Dermatological Problems: No





- MUSCULOSKELETAL/RHEUMATOLOGICAL


Hx Musculoskeletal Disorders: No





- GASTROINTESTINAL


Hx Gastrointestinal Disorders: No





- GENITOURINARY/GYNECOLOGICAL


Hx Genitourinary Disorders: No





- PSYCHIATRIC


Hx Substance Use: No





- SURGICAL HISTORY


Hx Surgeries: No





- ANESTHESIA


Hx Anesthesia: No





Meds


Allergies/Adverse Reactions: 


 Allergies











Allergy/AdvReac Type Severity Reaction Status Date / Time


 


haloperidol [From Haldol] Allergy  ANGIOEDEMA Verified 12/19/17 13:23














- Medications


Medications: 


 Current Medications





Acetaminophen (Tylenol 325mg Tab)  650 mg PO Q4 PRN


   PRN Reason: pain level 3 to 7


Al Hydrox/Mg Hydrox/Simethicone (Maalox Plus 30 Ml)  30 ml PO Q4 PRN


   PRN Reason: Dyspepsia


Diphenhydramine HCl (Benadryl)  50 mg IM Q6 PRN


   PRN Reason: Extrapyramidal S/S Unable PO


Diphenhydramine HCl (Benadryl)  50 mg PO Q6 PRN


   PRN Reason: Extrapyramidal Symptoms


Diphenhydramine HCl (Benadryl)  50 mg PO HS PRN


   PRN Reason: Sleep


Hydroxyzine Pamoate (Vistaril)  25 mg PO TID PRN


   PRN Reason: Anxiety


Lorazepam (Ativan)  2 mg IM Q4 PRN


   PRN Reason: Anxiety/Agitation,Unable PO


Lorazepam (Ativan)  2 mg PO Q4 PRN


   PRN Reason: Anxiety/Agitation


   Last Admin: 12/21/17 12:52 Dose:  2 mg


Magnesium Hydroxide (Milk Of Magnesia)  30 ml PO HS PRN


   PRN Reason: Constipation


Mirtazapine (Remeron)  30 mg PO HS JOSE


   Last Admin: 12/20/17 21:44 Dose:  30 mg


Risperidone (Risperdal M-Tab)  1 mg PO DAILY JOSE











Physical Exam





- Constitutional


Appears: Well, Non-toxic, No Acute Distress, Unkempt





- Head Exam


Head Exam: ATRAUMATIC, NORMAL INSPECTION





- Eye Exam


Eye Exam: Normal appearance





- ENT Exam


ENT Exam: Mucous Membranes Moist





- Respiratory Exam


Respiratory Exam: Clear to Auscultation Bilateral.  absent: Rales, Wheezes





- Cardiovascular Exam


Cardiovascular Exam: REGULAR RHYTHM, +S1, +S2





- GI/Abdominal Exam


GI & Abdominal Exam: Soft.  absent: Tenderness





- Neurological Exam


Neurological exam: Alert, Oriented x3





- Psychiatric Exam


Psychiatric exam: Anxious, Depressed, Flat Affect


Additional comments: 


Tearful








- Skin


Skin Exam: Normal Color





Results





- Vital Signs


Recent Vital Signs: 


 Last Vital Signs











Temp  98.1 F   12/20/17 16:32


 


Pulse  110 H  12/20/17 16:32


 


Resp  20   12/20/17 16:32


 


BP  128/78   12/20/17 16:32


 


Pulse Ox  98   12/19/17 22:12














- Labs


Result Diagrams: 


 12/19/17 14:40





 12/19/17 14:40


Labs: 


 Laboratory Results - last 24 hr











  12/20/17 12/20/17





  08:03 08:03


 


Hemoglobin A1c  5.0 


 


RPR   Nonreactive














Assessment & Plan





- Assessment and Plan (Free Text)


Assessment: 


 Patient is a 28 y/o female with Schitzoaffective disorder admitted for 

suicidal ideation in the presence of persecutory auditory hallucinations. 








#Schizoaffective Disorder, acute


-as per psychiatry


-Currently receiving Risperidone injections 


-Ativan prn 





#Sinus Tachycardia


-Likely anxiety related


-Thyroid labs normal


-U tox normal


-Will continue to monitor 





#Diet


-Regular

















<Maikel Mon - Last Filed: 12/21/17 15:26>





Meds





- Medications


Medications: 


 Current Medications





Acetaminophen (Tylenol 325mg Tab)  650 mg PO Q4 PRN


   PRN Reason: pain level 3 to 7


Al Hydrox/Mg Hydrox/Simethicone (Maalox Plus 30 Ml)  30 ml PO Q4 PRN


   PRN Reason: Dyspepsia


Chlorpromazine (Thorazine)  50 mg PO Q12 PRN


   PRN Reason: Agitation


Diphenhydramine HCl (Benadryl)  50 mg IM Q6 PRN


   PRN Reason: Extrapyramidal S/S Unable PO


Diphenhydramine HCl (Benadryl)  50 mg PO Q6 PRN


   PRN Reason: Extrapyramidal Symptoms


Diphenhydramine HCl (Benadryl)  50 mg PO HS PRN


   PRN Reason: Sleep


Hydroxyzine Pamoate (Vistaril)  25 mg PO TID PRN


   PRN Reason: Anxiety


Lorazepam (Ativan)  2 mg IM Q4 PRN


   PRN Reason: Anxiety/Agitation,Unable PO


Lorazepam (Ativan)  2 mg PO Q4 PRN


   PRN Reason: Anxiety/Agitation


   Last Admin: 12/21/17 12:52 Dose:  2 mg


Magnesium Hydroxide (Milk Of Magnesia)  30 ml PO HS PRN


   PRN Reason: Constipation


Mirtazapine (Remeron)  30 mg PO HS JOSE


   Last Admin: 12/20/17 21:44 Dose:  30 mg


Risperidone (Risperdal M-Tab)  1 mg PO DAILY JOSE











Results





- Vital Signs


Recent Vital Signs: 


 Last Vital Signs











Temp  98.1 F   12/20/17 16:32


 


Pulse  110 H  12/20/17 16:32


 


Resp  20   12/20/17 16:32


 


BP  128/78   12/20/17 16:32


 


Pulse Ox  98   12/19/17 22:12














- Labs


Result Diagrams: 


 12/19/17 14:40





 12/19/17 14:40


Labs: 


 Laboratory Results - last 24 hr











  12/20/17





  08:03


 


RPR  Nonreactive














Assessment & Plan





- Assessment and Plan (Free Text)


Plan: 





Patient seen and examined, agree with findings and plan as documented by the 

resident. Sinus tachycardia likely secondary to anxiety as Urine toxicology and 

thyroid panel normal with no signs of infection. Will continue to monitor.

## 2017-12-21 NOTE — PCM.PYCHPN
Psychiatric Progress Note





- Psychiatric Progress Note


Patient seen today, length of contact: pt evaluated discussed with team chart 

reviewed


Patient Chief Complaint: 





I will try the risperidone injection


Problems Identified/Issues Discussed: 





pt seen in bed, dressed in a hospital gown, unkempt , isolative, pt reported 

continues to experience non command auditory hallucinations making her anxious, 

also feeling down and anhedonic


discussed with pt starting risperidone consta injection, for more 

pharmacological efficacy as she continues to experience psychotic symptoms on 

Invegs, pt agreed


denied any current S/H I denied side effects of oral risperidone


Medical Problems: 





multiple inpatient hospitalizations for psychotic symptoms


DSM 5 Symptoms Update: 





schizoaffective disorder bipolar


Medication Change: Yes (start risperidone consta)


Medical Record Reviewed: Yes





Mental Status Examination





- Cognitive Function


Orientation: Person, Place


Memory: Intact


Attention: Poor


Concentration: Poor


Association: WNL


Fund of Knowledge: Poor





- Mood


Mood: Depressed, Anxious





- Affect


Affect: Constricted, Blunted





- Speech


Speech: Soft


Additional comments: 





underproductive





- Formal Thought Process


Formal Thought Process: Delusions, Paranoia, Circumstantial





- Suicidal Ideation


Suicidal Ideation: No





- Homicidal Ideation


Homicidal Ideation: No





Goal/Treatment Plan





- Goal/Treatment Plan


Need for Continued Stay: Discharge may exacerbated symptoms


Progress Toward Problem(s) and Goals/Treatment Plan: 





decrease oral  risperidone to 1mg bid


start risperidone consta 25mg im


vistaril prn for anxiety, remeron 30mg qhs for depression


group and supportive therapy

## 2017-12-22 RX ADMIN — GUAIFENESIN PRN MG: 100 SOLUTION ORAL at 17:26

## 2017-12-22 RX ADMIN — RISPERIDONE SCH MG: 2 TABLET, ORALLY DISINTEGRATING ORAL at 13:56

## 2017-12-22 RX ADMIN — RISPERIDONE SCH MG: 2 TABLET, ORALLY DISINTEGRATING ORAL at 17:26

## 2017-12-23 RX ADMIN — GUAIFENESIN PRN MG: 100 SOLUTION ORAL at 17:02

## 2017-12-23 RX ADMIN — RISPERIDONE SCH MG: 2 TABLET, ORALLY DISINTEGRATING ORAL at 09:06

## 2017-12-23 RX ADMIN — RISPERIDONE SCH MG: 2 TABLET, ORALLY DISINTEGRATING ORAL at 17:02

## 2017-12-23 NOTE — PCM.PYCHPN
Psychiatric Progress Note





- Psychiatric Progress Note


Patient seen today, length of contact: Patient evaluated, case discussed with 

team, chart reviewed


Patient Chief Complaint: 


"I'm hearing voices."


Problems Identified/Issues Discussed: 


Patient continues to be disorganized and psychotic.  She reports command 

auditory hallucinations telling her that she should "die in my sleep or shave 

my head."   She continues to need 1:1 for safety due to erratic behavior and 

acute psychosis.   She reports that she currently feels depressed.


Medication Change: No


Medical Record Reviewed: Yes


Consults ordered or reviewed: 


Medicine consult





Mental Status Examination





- Cognitive Function


Orientation: Person, Place


Memory: Intact


Attention: Poor


Concentration: Poor


Association: WNL


Fund of Knowledge: Poor


Decription of patient's judgement and insights: 


Poor I/J





- Mood


Mood: Depressed





- Affect


Affect: Blunted





- Speech


Speech: Soft





- Formal Thought Process


Formal Thought Process: Hallucinations, Paranoia, Circumstantial


Psychotic Thoughts and Behaviors: 


+CAH





- Suicidal Ideation


Suicidal Ideation: No





- Homicidal Ideation


Homicidal Ideation: No





Goal/Treatment Plan





- Goal/Treatment Plan


Need for Continued Stay: Remain at risks for inpatient hospitalization, Severe 

depression anxiety, Discharge may exacerbated symptoms


Progress Toward Problem(s) and Goals/Treatment Plan: 


Schizoaffective Disorder; patient needs continued hospitalization for treatment 

and safety





-Individual and group therapy


-Continue 1:1 for safety


-Contiue Risperdal 2 mg PO BID


-Continue Thorazine 100 mg q8 hr PRN as patient is allergic to Haldol


-Continue Risperdone Consta 25 mg IM q2 weeks


-Continue Remeron 30 mg PO HS


-Continue Vistaril PRN anxiety k


Estimated Date of D/C: 12/29/17

## 2017-12-24 VITALS — OXYGEN SATURATION: 100 %

## 2017-12-24 RX ADMIN — GUAIFENESIN PRN MG: 100 SOLUTION ORAL at 21:03

## 2017-12-24 RX ADMIN — Medication PRN SPRAY: at 21:03
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
## 2017-12-25 RX ADMIN — Medication PRN SPRAY: at 14:36

## 2017-12-25 RX ADMIN — Medication PRN SPRAY: at 07:48

## 2017-12-25 RX ADMIN — OLANZAPINE SCH MG: 5 TABLET, ORALLY DISINTEGRATING ORAL at 18:03

## 2017-12-25 RX ADMIN — RISPERIDONE SCH MG: 2 TABLET, ORALLY DISINTEGRATING ORAL at 11:36

## 2017-12-25 RX ADMIN — OLANZAPINE SCH MG: 5 TABLET, ORALLY DISINTEGRATING ORAL at 11:36

## 2017-12-25 RX ADMIN — GUAIFENESIN PRN MG: 100 SOLUTION ORAL at 07:48
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
## 2017-12-26 RX ADMIN — RISPERIDONE SCH MG: 2 TABLET, ORALLY DISINTEGRATING ORAL at 09:08

## 2017-12-26 RX ADMIN — GUAIFENESIN PRN MG: 100 SOLUTION ORAL at 10:32

## 2017-12-26 RX ADMIN — OLANZAPINE SCH MG: 5 TABLET, ORALLY DISINTEGRATING ORAL at 17:24

## 2017-12-26 RX ADMIN — OLANZAPINE SCH MG: 5 TABLET, ORALLY DISINTEGRATING ORAL at 09:09

## 2017-12-26 RX ADMIN — OLANZAPINE SCH MG: 5 TABLET, ORALLY DISINTEGRATING ORAL at 13:39

## 2017-12-26 RX ADMIN — Medication PRN SPRAY: at 17:25

## 2017-12-26 NOTE — PCM.PYCHPN
Psychiatric Progress Note





- Psychiatric Progress Note


Patient seen today, length of contact: Patient evaluated, case discussed with 

team, chart reviewed


Patient Chief Complaint: 





The voices are less today but they tell me I should not be alive


Problems Identified/Issues Discussed: 





pt on evaluation appears calmer and less distressed, continues to experience 

command hallucinations however reported that they have dampened 


pt appears less depressed, better communication attending groups, less paranoid 


Medical Problems: 





multiple inpatient hospitalizations for psychotic symptoms


DSM 5 Symptoms Update: 





schizophrenia paranoid type


Medication Change: Yes (increase zyprexa)


Medical Record Reviewed: Yes





Mental Status Examination





- Cognitive Function


Orientation: Person, Place


Memory: Intact


Attention: Poor


Concentration: Poor


Association: WNL


Fund of Knowledge: Poor





- Mood


Mood: Depressed





- Affect


Affect: Blunted





- Speech


Speech: Soft





- Formal Thought Process


Formal Thought Process: Hallucinations, Delusions, Paranoia, Circumstantial


Psychotic Thoughts and Behaviors: 





pt presenting with command auditory hallucinations telling her to hurt herself 

and putting her down





- Suicidal Ideation


Suicidal Ideation: Yes





- Homicidal Ideation


Homicidal Ideation: No





Goal/Treatment Plan





- Goal/Treatment Plan


Need for Continued Stay: Remain at risks for inpatient hospitalization, Severe 

depression anxiety, Discharge may exacerbated symptoms


Progress Toward Problem(s) and Goals/Treatment Plan: 


increase zyprexa to 5mg tid


downtitrate risperidone and discontinue gradually





continue 1:1observation for command hallucinations and suicide risk


decrease remeron gradually


group and supportive therapy


Estimated Date of D/C: 12/29/17

## 2017-12-27 RX ADMIN — OLANZAPINE SCH MG: 5 TABLET, ORALLY DISINTEGRATING ORAL at 08:57

## 2017-12-27 RX ADMIN — OLANZAPINE SCH MG: 5 TABLET, ORALLY DISINTEGRATING ORAL at 17:00

## 2017-12-27 RX ADMIN — OLANZAPINE SCH MG: 5 TABLET, ORALLY DISINTEGRATING ORAL at 21:11

## 2017-12-27 NOTE — PCM.PYCHPN
Psychiatric Progress Note





- Psychiatric Progress Note


Patient seen today, length of contact: Patient evaluated, case discussed with 

team, chart reviewed


Patient Chief Complaint: 





The voices tell me I shoul die in an electric chair


Problems Identified/Issues Discussed: 





pt on evaluation appears distressed today as hse continues to hear the command 

auditory hallucinations telling her she should die in an electric chair as she 

exposed herself and her family to every body


asked the pt to rate the voices she said they are now 4/10 damper and 

whispering but still wants her to end her life


pt presenting anxious and distressed and worried about her placement on 

discharge


continues to be anhedonic ,deniedd command hallucinations denied side effects 

of the medications


Medical Problems: 





multiple inpatient hospitalizations for psychotic symptoms


DSM 5 Symptoms Update: 





schizophrenia paranoid type


Medication Change: Yes (increase zyprexa)


Medical Record Reviewed: Yes





Mental Status Examination





- Cognitive Function


Orientation: Person, Place


Memory: Intact


Attention: Poor


Concentration: Poor


Association: WNL


Fund of Knowledge: Poor





- Mood


Mood: Depressed





- Affect


Affect: Blunted





- Speech


Speech: Soft





- Formal Thought Process


Formal Thought Process: Hallucinations, Delusions, Paranoia, Circumstantial


Psychotic Thoughts and Behaviors: 





pt presenting with command auditory hallucinations telling her to hurt herself 

and putting her down


and having paranoid delusions that people around her want to hurt her





- Suicidal Ideation


Suicidal Ideation: Yes





- Homicidal Ideation


Homicidal Ideation: No





Goal/Treatment Plan





- Goal/Treatment Plan


Need for Continued Stay: Remain at risks for inpatient hospitalization, Severe 

depression anxiety, Discharge may exacerbated symptoms


Progress Toward Problem(s) and Goals/Treatment Plan: 


ipt continues to have command auditory hallucinations


continue 1:1observation for  suicide risk


decrease remeron gradually, discontinue risperidone and increase zyprexa to 

17.5 mg


will monitor pt for psychopharmacological effects and side effect profile 


if limited response to zyprexa will consider starting clozaril


group and supportive therapy


Estimated Date of D/C: 01/05/18

## 2017-12-28 RX ADMIN — GUAIFENESIN PRN MG: 100 SOLUTION ORAL at 11:57

## 2017-12-28 RX ADMIN — VENLAFAXINE HYDROCHLORIDE SCH MG: 37.5 CAPSULE, EXTENDED RELEASE ORAL at 12:26

## 2017-12-28 RX ADMIN — OLANZAPINE SCH MG: 5 TABLET, ORALLY DISINTEGRATING ORAL at 21:08

## 2017-12-28 RX ADMIN — OLANZAPINE SCH MG: 5 TABLET, ORALLY DISINTEGRATING ORAL at 09:30

## 2017-12-28 RX ADMIN — OLANZAPINE SCH MG: 5 TABLET, ORALLY DISINTEGRATING ORAL at 17:50

## 2017-12-28 NOTE — PCM.PYCHPN
Psychiatric Progress Note





- Psychiatric Progress Note


Patient seen today, length of contact: Patient evaluated, case discussed with 

team, chart reviewed


Patient Chief Complaint: 





The voices are whispering now they are less and they tell me to stop being shy


Problems Identified/Issues Discussed: 





pt on evaluation appears less distressed, reported feeling better on current 

medications and the voices now are 3/10 only whispering but still putting her 

down 


encouraged pt to attend groups


appears less depressed, reported better mood with brighter affect, denied any 

homicidal thoughts however, continues to feel controlled by the auditory 

hallucinations despite they are damper


no reported side effects of zyprexa





Medical Problems: 





multiple inpatient hospitalizations for psychotic symptoms


DSM 5 Symptoms Update: 





schizophrenia paranoid type


Medication Change: Yes (decrease remeron and start effexor)


Medical Record Reviewed: Yes





Mental Status Examination





- Cognitive Function


Orientation: Person, Place


Memory: Intact


Attention: Poor


Concentration: Poor


Association: WNL


Fund of Knowledge: Poor





- Mood


Mood: Depressed





- Affect


Affect: Blunted





- Speech


Speech: Soft





- Formal Thought Process


Formal Thought Process: Hallucinations, Delusions, Paranoia, Circumstantial


Psychotic Thoughts and Behaviors: 





pt presenting with command auditory hallucinations telling her to hurt herself 

and putting her down


and having paranoid delusions that people around her want to hurt her





- Suicidal Ideation


Suicidal Ideation: Yes





- Homicidal Ideation


Homicidal Ideation: No





Goal/Treatment Plan





- Goal/Treatment Plan


Need for Continued Stay: Remain at risks for inpatient hospitalization, Severe 

depression anxiety, Discharge may exacerbated symptoms


Progress Toward Problem(s) and Goals/Treatment Plan: 


pt continues to have command auditory hallucinations


continue 1:1observation for  suicide risk


decrease remeron gradually, continue  zyprexa  17.5 mg, start effexor 37.5 mg 

and uptitrate gradually


will monitor pt for psychopharmacological effects and side effect profile 


if limited response to zyprexa will consider starting clozaril


group and supportive therapy


Estimated Date of D/C: 01/05/18

## 2017-12-29 RX ADMIN — OLANZAPINE SCH MG: 5 TABLET, ORALLY DISINTEGRATING ORAL at 09:02

## 2017-12-29 RX ADMIN — OLANZAPINE SCH MG: 5 TABLET, ORALLY DISINTEGRATING ORAL at 21:18

## 2017-12-29 RX ADMIN — OLANZAPINE SCH MG: 5 TABLET, ORALLY DISINTEGRATING ORAL at 16:14

## 2017-12-29 RX ADMIN — VENLAFAXINE HYDROCHLORIDE SCH MG: 37.5 CAPSULE, EXTENDED RELEASE ORAL at 09:03

## 2017-12-29 NOTE — PCM.BM
<Jose G Langley - Last Filed: 12/29/17 15:57>





Treatment Plan Problems





- Problems identified on initial assessmt


  ** Delusions


Date Initiated: 12/20/17


Time Initiated: 02:09


Assessment reference: NA


Status: Active





  ** Auditory Hallucinations


Date Initiated: 12/20/17


Time Initiated: 02:09


Assessment reference: NA


Status: Active





Treatment assets and liabiliti


Patient Assests: adapts well, cooperative, ADL independent, physically healthy, 

good support system, negotiates basic needs


Patient Liabilities: poor support system





- Milieu Protocol


Maintain good personal hygiene: daily Encourage regular showers, every shift 

Remind patient to perform daily oral care


Maintain personal safety: every shift Educate patient to report safety concerns 

to staff, every shift Monitor environment for contraband/sharps


Medication safety: Monitor for expected outcome, potential side effects: every 

shift, Assess barriers to learning: every shift, Assess readiness for 

medication education: every shift


Milieu Narrative: 





discontinue 1:1observation as pt at current mental status denied any command 

hallucinations denied any suicidal or homicidal ideations 


decrease remeron gradually, continue  zyprexa  17.5 mg, and  effexor 37.5 mg 

and uptitrate gradually


will monitor pt for psychopharmacological effects and side effect profile 


CBT group and supportive therapy





Family Contact


Family involvement: Family/SO is involved


Family contact: Patient agrees to contact, Family has been contacted by patient

, Telephone contact initiated by staff


Family contact name: Smooth()([phone])


Family contacted how many times per week?: 2


Family contact comment: Writer placed call to patients aunt (Smooth [phone]

) to discuss precursors to hospitalization and progress on 3NP. Patients aunt 

reports that she checks in with patient frequently but has been unable to visit 

recently due to having to care for patients grandmother. Patients aunt reports 

that patient used to reside with her but started to get threatening towards her 

children and had to find alternate housing. Smooth expressed concerns regarding 

possibility of patient harming herself or others while decompensated. As per 

patients aunt patient has hx of being inconsistently compliant with 

medications. Smooth requested psychoeducation regarding mental illness and 

medication management. Psychoeducation provided. Writer emphasized importance 

of compliance with aftercare to reduce risk of future hospitalizations and 

improve functioning in the community. Writer notified Smooth that patient will 

be placed on a 1:1 on 3NP secondary to inabiliy to contract for saftey at 

present time. Patients aunt expressed understanding of the above. Writer 

provided unit payphones and visitation hours. Writer to continue providing 

clinical updates through-out patients hospitalization.





- Outside Agency


  ** Agency 1


Care involvment: Following patient during stay, Other


Agency contact name: [facility]


Agency contact number: [phone]





  ** Agency 2


Care involvment: Following patient during stay, Other


Agency contact name: [facility]


Agency contact number: [phone]





- Goals for Treatment


Patient goals for treatment: Patient to continue stabilization on 3NP through 

medication management and group/supportive therapy. Patient to be encouraged to 

attend groups regularly to promote self-awareness, compliance, and improve 

insight, coping skills and self-esteem. Patient to be provided with referral 

for appropriate level of aftercare to reduce risk of future hospitalizations 

and ensure safety in the community.





Discharge/Continuing Care





- Education Needs


Education Needs: Family Medication, Family Coping Skills, Family Community 

resources, Family Aftercare Safety Plan, Patient Medication, Patient Coping 

Skills, Patient Community resources, Patient Aftercare Safety Plan





- Discharge


Discharge Criteria: Tolerates medication w/o severe side effects, Free of 

Suicidal thoughts, Free of Homicidal thoughts, Free of paranoid thoughts, Free 

of agitation, Normal sleep pattern, Ability to care for self, Reduction of 

target symptoms


Discharge to:: Home, Other





- Treatment Team Participation


Patient/Family/SO Statement: 





discontinue 1:1observation as pt at current mental status denied any command 

hallucinations denied any suicidal or homicidal ideations 


decrease remeron gradually, continue  zyprexa  17.5 mg, and  effexor 37.5 mg 

and uptitrate gradually


will monitor pt for psychopharmacological effects and side effect profile 


CBT group and supportive therapy





Treatment Plan Review





- Problem


  ** Delusions


Date Initiated: 12/29/17


Time Initiated: 02:09


Progress toward outcomes: improved





  ** Auditory Hallucinations


Date Initiated: 12/29/17


Time Initiated: 02:09


Progress toward outcomes: improved





- Discharge / Continuing Care


Discharge to:: Group Home


Behavioral Health Services: Partial hospital (Pt reported that the auditory 

hallucinations are decreased and she is able to contatract for safety. Pt 

brought up discharge and discussion to PHP was had and pt is agreeable to 

attend [facility] if they are willing to accept her again.)





<Monica Cornejo A - Last Filed: 12/30/17 11:21>





- Diagnosis


(1) Schizoaffective disorder


Status: Acute   


Interventions: 


psychotherapy pharmacotherapy


12/30/17 11:21

## 2017-12-29 NOTE — PCM.PYCHPN
Psychiatric Progress Note





- Psychiatric Progress Note


Patient seen today, length of contact: Patient evaluated, case discussed with 

team, chart reviewed


Patient Chief Complaint: 





The voices are only whispering, they are very low, they do not want me to hurt 

myself any more


Problems Identified/Issues Discussed: 





pt on evaluation in treatment team more kempt and better groomed , presenting 

with brighter affect and reported mood is better and  less depressed


pt rated the auditory hallucinations today to be 1/10 and that they are just 

far away whispers and non command in nature


stated feeling motivated to attend groups and to get better so she can go home


denied any current suicidal or homicidal ideations denied command 

hallucinations no reported side effects of medications


Medical Problems: 





multiple inpatient hospitalizations for psychotic symptoms


DSM 5 Symptoms Update: 





schizophrenia


Medication Change: Yes (decrease remeron )


Medical Record Reviewed: Yes





Mental Status Examination





- Cognitive Function


Orientation: Person, Place


Memory: Intact


Attention: WNL


Concentration: Poor


Association: WNL


Fund of Knowledge: Poor





- Mood


Mood: Anxious, Neutral





- Affect


Affect: Constricted





- Speech


Speech: Soft





- Formal Thought Process


Formal Thought Process: Hallucinations, Delusions, Paranoia, Circumstantial


Psychotic Thoughts and Behaviors: 





pt presenting today with non command whispering low auditory hallucinations





- Suicidal Ideation


Suicidal Ideation: No





- Homicidal Ideation


Homicidal Ideation: No





Goal/Treatment Plan





- Goal/Treatment Plan


Need for Continued Stay: Remain at risks for inpatient hospitalization, Severe 

depression anxiety, Discharge may exacerbated symptoms


Progress Toward Problem(s) and Goals/Treatment Plan: 





discontinue 1:1observation as pt at current mental status denied any command 

hallucinations denied any suicidal or homicidal ideations 


decrease remeron gradually, continue  zyprexa  17.5 mg, and  effexor 37.5 mg 

and uptitrate gradually


will monitor pt for psychopharmacological effects and side effect profile 


CBT group and supportive therapy


Estimated Date of D/C: 01/05/18

## 2017-12-30 RX ADMIN — VENLAFAXINE HYDROCHLORIDE SCH MG: 37.5 CAPSULE, EXTENDED RELEASE ORAL at 09:39

## 2017-12-30 RX ADMIN — OLANZAPINE SCH MG: 5 TABLET, ORALLY DISINTEGRATING ORAL at 21:09

## 2017-12-30 RX ADMIN — OLANZAPINE SCH MG: 5 TABLET, ORALLY DISINTEGRATING ORAL at 09:42

## 2017-12-30 RX ADMIN — OLANZAPINE SCH MG: 5 TABLET, ORALLY DISINTEGRATING ORAL at 17:43

## 2017-12-30 NOTE — PCM.PYCHPN
Psychiatric Progress Note





- Psychiatric Progress Note


Patient seen today, length of contact: Patient evaluated, case discussed with 

team, chart reviewed


Patient Chief Complaint: 





I feel better today, the voices are very low and I can stop them


Problems Identified/Issues Discussed: 





pt on evaluation icalmer, cooperative, better eye contact , presenting with 

brighter affect and reported mood is better and  less depressed


pt rated the auditory hallucinations today to be 1/10 and that they are just 

far away whispers and non command in nature


denied any current suicidal or homicidal ideations denied command 

hallucinations no reported side effects of medications


Medical Problems: 





multiple inpatient hospitalizations for psychotic symptoms


DSM 5 Symptoms Update: 





schizoaffective disorder depressed


Medication Change: Yes (stop  remeron increase zyprexa)


Medical Record Reviewed: Yes





Mental Status Examination





- Cognitive Function


Orientation: Person, Place


Memory: Intact


Attention: WNL


Concentration: Poor


Association: WNL


Fund of Knowledge: Poor





- Mood


Mood: Anxious, Neutral





- Affect


Affect: Constricted





- Speech


Speech: Soft





- Formal Thought Process


Formal Thought Process: Hallucinations, Delusions, Paranoia, Circumstantial


Psychotic Thoughts and Behaviors: 





pt presenting today with non command whispering low auditory hallucinations





- Suicidal Ideation


Suicidal Ideation: No





- Homicidal Ideation


Homicidal Ideation: No





Goal/Treatment Plan





- Goal/Treatment Plan


Need for Continued Stay: Remain at risks for inpatient hospitalization, Severe 

depression anxiety, Discharge may exacerbated symptoms


Progress Toward Problem(s) and Goals/Treatment Plan: 





 pt at current mental status denied any command hallucinations denied any 

suicidal or homicidal ideations 


discontinue remeron and increase effexor to 75 mg ,increase   zyprexa  to 20 mg

, 


will monitor pt for psychopharmacological effects and side effect profile 


CBT group and supportive therapy


Estimated Date of D/C: 01/05/18

## 2017-12-31 RX ADMIN — VENLAFAXINE HYDROCHLORIDE SCH MG: 75 CAPSULE, EXTENDED RELEASE ORAL at 08:42

## 2017-12-31 RX ADMIN — OLANZAPINE SCH MG: 5 TABLET, ORALLY DISINTEGRATING ORAL at 21:31

## 2017-12-31 RX ADMIN — OLANZAPINE SCH MG: 5 TABLET, ORALLY DISINTEGRATING ORAL at 08:42

## 2017-12-31 NOTE — PCM.PYCHPN
Psychiatric Progress Note





- Psychiatric Progress Note


Patient seen today, length of contact: Patient evaluated, case discussed with 

team, chart reviewed


Patient Chief Complaint: 





I feel better today, I wish I could go home ,the voices are very low and I can 

stop them


Problems Identified/Issues Discussed: 





pt on evaluation calmer, cooperative, better eye contact , speech more 

productive and more goal directed 


presenting with brighter affect and reported mood is better and  less depressed


pt rated the auditory hallucinations today to be 1/10 and that they are just 

far away whispers and non command in nature


denied any current suicidal or homicidal ideations denied command 

hallucinations no reported side effects of medications


Medical Problems: 





multiple inpatient hospitalizations for psychotic symptoms


DSM 5 Symptoms Update: 





schizophrenia 


Medication Change: No


Medical Record Reviewed: Yes





Mental Status Examination





- Cognitive Function


Orientation: Person, Place


Memory: Intact


Attention: WNL


Concentration: Poor


Association: WNL


Fund of Knowledge: Poor





- Mood


Mood: Anxious, Neutral





- Affect


Affect: Constricted





- Speech


Speech: Soft





- Formal Thought Process


Formal Thought Process: Hallucinations, Delusions, Paranoia, Circumstantial


Psychotic Thoughts and Behaviors: 





pt presenting today with non command whispering low auditory hallucinations





- Suicidal Ideation


Suicidal Ideation: No





- Homicidal Ideation


Homicidal Ideation: No





Goal/Treatment Plan





- Goal/Treatment Plan


Need for Continued Stay: Remain at risks for inpatient hospitalization, Severe 

depression anxiety, Discharge may exacerbated symptoms


Progress Toward Problem(s) and Goals/Treatment Plan: 





 pt at current mental status denied any command hallucinations denied any 

suicidal or homicidal ideations 


continue with  effexor  75 mg ,increase   zyprexa  to 20 mg, 


will monitor pt for psychopharmacological effects and side effect profile 


CBT group and supportive therapy


Estimated Date of D/C: 01/05/18

## 2018-01-01 RX ADMIN — OLANZAPINE SCH MG: 5 TABLET, ORALLY DISINTEGRATING ORAL at 10:28

## 2018-01-01 RX ADMIN — OLANZAPINE SCH MG: 5 TABLET, ORALLY DISINTEGRATING ORAL at 21:32

## 2018-01-01 RX ADMIN — OLANZAPINE SCH MG: 5 TABLET, ORALLY DISINTEGRATING ORAL at 10:27

## 2018-01-01 RX ADMIN — OLANZAPINE SCH MG: 5 TABLET, ORALLY DISINTEGRATING ORAL at 16:28

## 2018-01-01 RX ADMIN — VENLAFAXINE HYDROCHLORIDE SCH MG: 75 CAPSULE, EXTENDED RELEASE ORAL at 10:27

## 2018-01-01 NOTE — PCM.PYCHPN
Psychiatric Progress Note





- Psychiatric Progress Note


Patient seen today, length of contact: Patient evaluated, case discussed with 

team, chart reviewed


Patient Chief Complaint: 





I feel better I washed my hair and watched TV


Problems Identified/Issues Discussed: 





pt on evaluation calmer, cooperative, better eye contact , speech more 

productive and more goal directed , Interacting with other peers


presenting with brighter affect and reported mood is better and  less depressed


pt rated the auditory hallucinations today to be 1/10 and that they are just 

far away whispers and non command in nature, pt able to ignore them


denied any current suicidal or homicidal ideations denied command 

hallucinations no reported side effects of medications


Medical Problems: 





multiple inpatient hospitalizations for psychotic symptoms


DSM 5 Symptoms Update: 





schizophrenia


Medication Change: No


Medical Record Reviewed: Yes





Mental Status Examination





- Cognitive Function


Orientation: Person, Place


Memory: Intact


Attention: WNL


Concentration: Poor


Association: WNL


Fund of Knowledge: Poor





- Mood


Mood: Anxious, Neutral





- Affect


Affect: Constricted





- Speech


Speech: Soft





- Formal Thought Process


Formal Thought Process: Hallucinations, Circumstantial


Psychotic Thoughts and Behaviors: 





pt presenting today with non command whispering low auditory hallucinations





- Suicidal Ideation


Suicidal Ideation: No





- Homicidal Ideation


Homicidal Ideation: No





Goal/Treatment Plan





- Goal/Treatment Plan


Need for Continued Stay: Remain at risks for inpatient hospitalization, Severe 

depression anxiety, Discharge may exacerbated symptoms


Progress Toward Problem(s) and Goals/Treatment Plan: 





 pt at current mental status denied any command hallucinations denied any 

suicidal or homicidal ideations 


continue with  effexor  75 mg ,and  zyprexa   20 mg, 


will monitor pt for psychopharmacological effects and side effect profile 


CBT group and supportive therapy


Estimated Date of D/C: 01/05/18

## 2018-01-02 RX ADMIN — OLANZAPINE SCH MG: 5 TABLET, ORALLY DISINTEGRATING ORAL at 09:33

## 2018-01-02 RX ADMIN — OLANZAPINE SCH MG: 5 TABLET, ORALLY DISINTEGRATING ORAL at 21:06

## 2018-01-02 RX ADMIN — OLANZAPINE SCH MG: 5 TABLET, ORALLY DISINTEGRATING ORAL at 16:32

## 2018-01-02 RX ADMIN — VENLAFAXINE HYDROCHLORIDE SCH MG: 75 CAPSULE, EXTENDED RELEASE ORAL at 09:33

## 2018-01-02 NOTE — PCM.PYCHPN
Psychiatric Progress Note





- Psychiatric Progress Note


Patient seen today, length of contact: Patient evaluated, case discussed with 

team, chart reviewed


Patient Chief Complaint: 





I am alright I want to stay with my grandmother when I leave


Problems Identified/Issues Discussed: 





pt on evaluation more interactive and presenting with better mood and brighter 

affect, continues to have faint non command auditory hallucinations, poosible 

reaching base line


no reported changes in sleep or appetite denied suicidal or homicidal ideations

, no reported side effects of medications





Medical Problems: 





multiple inpatient hospitalizations for psychotic symptoms


DSM 5 Symptoms Update: 





schizophrenia


Medication Change: No


Medical Record Reviewed: Yes





Mental Status Examination





- Cognitive Function


Orientation: Person, Place


Memory: Intact


Attention: WNL


Concentration: WNL


Association: WNL


Fund of Knowledge: Poor


Decription of patient's judgement and insights: 





partial insight and poor judgement





- Mood


Mood: Neutral





- Affect


Affect: Constricted





- Speech


Speech: Soft





- Formal Thought Process


Formal Thought Process: Hallucinations, Circumstantial


Psychotic Thoughts and Behaviors: 





pt presenting today with non command whispering low auditory hallucinations





- Suicidal Ideation


Suicidal Ideation: No





- Homicidal Ideation


Homicidal Ideation: No





Goal/Treatment Plan





- Goal/Treatment Plan


Need for Continued Stay: Remain at risks for inpatient hospitalization, Severe 

depression anxiety, Discharge may exacerbated symptoms


Progress Toward Problem(s) and Goals/Treatment Plan: 





 pt at current mental status denied any command hallucinations denied any 

suicidal or homicidal ideations 


continue with  effexor  75 mg ,and  zyprexa   20 mg, 


will monitor pt for psychopharmacological effects and side effect profile 


 to arrange for after care plan as pt agreed to start joining 

partial program at [facility]


CBT group and supportive therapy


Estimated Date of D/C: 01/05/18

## 2018-01-03 VITALS — TEMPERATURE: 98.1 F

## 2018-01-03 RX ADMIN — OLANZAPINE SCH MG: 5 TABLET, ORALLY DISINTEGRATING ORAL at 17:16

## 2018-01-03 RX ADMIN — OLANZAPINE SCH MG: 5 TABLET, ORALLY DISINTEGRATING ORAL at 08:42

## 2018-01-03 RX ADMIN — OLANZAPINE SCH MG: 5 TABLET, ORALLY DISINTEGRATING ORAL at 21:10

## 2018-01-03 RX ADMIN — VENLAFAXINE HYDROCHLORIDE SCH MG: 75 CAPSULE, EXTENDED RELEASE ORAL at 10:10

## 2018-01-03 NOTE — PCM.PYCHPN
Psychiatric Progress Note





- Psychiatric Progress Note


Patient seen today, length of contact: Patient evaluated, case discussed with 

team, chart reviewed


Patient Chief Complaint: 





I feel better , ready to leave


Problems Identified/Issues Discussed: 





pt on evaluation reported better mood, brighter affect less isolative, reported 

clearing off of the auditory hallucinations, denied side effects of the 

medications


no reported changes in sleep or appetite denied suicidal or homicidal ideations


Medical Problems: 





multiple inpatient hospitalizations for psychotic symptoms


DSM 5 Symptoms Update: 





schizophrenia


Medication Change: No


Medical Record Reviewed: Yes





Mental Status Examination





- Cognitive Function


Orientation: Person, Place


Memory: Intact


Attention: WNL


Concentration: WNL


Association: WNL


Fund of Knowledge: Poor


Decription of patient's judgement and insights: 





partial insight and poor judgement





- Mood


Mood: Neutral





- Affect


Affect: Constricted





- Speech


Speech: Soft





- Formal Thought Process


Formal Thought Process: Hallucinations, Circumstantial


Psychotic Thoughts and Behaviors: 





pt reported clearing off of the auditory hallucinations





- Suicidal Ideation


Suicidal Ideation: No





- Homicidal Ideation


Homicidal Ideation: No





Goal/Treatment Plan





- Goal/Treatment Plan


Need for Continued Stay: Remain at risks for inpatient hospitalization, Severe 

depression anxiety, Discharge may exacerbated symptoms


Progress Toward Problem(s) and Goals/Treatment Plan: 





 pt at current mental status denied any command hallucinations denied any 

suicidal or homicidal ideations 


continue with  effexor  75 mg ,and  zyprexa   20 mg, 


will monitor pt for psychopharmacological effects and side effect profile 


 to arrange for after care plan as pt agreed to start joining 

partial program at [facility]


CBT group and supportive therapy


Estimated Date of D/C: 01/05/18

## 2018-01-04 VITALS — RESPIRATION RATE: 18 BRPM | HEART RATE: 76 BPM | SYSTOLIC BLOOD PRESSURE: 133 MMHG | DIASTOLIC BLOOD PRESSURE: 90 MMHG

## 2018-01-04 RX ADMIN — OLANZAPINE SCH MG: 5 TABLET, ORALLY DISINTEGRATING ORAL at 21:07

## 2018-01-04 RX ADMIN — VENLAFAXINE HYDROCHLORIDE SCH MG: 75 CAPSULE, EXTENDED RELEASE ORAL at 09:15

## 2018-01-04 RX ADMIN — OLANZAPINE SCH MG: 5 TABLET, ORALLY DISINTEGRATING ORAL at 18:31

## 2018-01-04 RX ADMIN — OLANZAPINE SCH MG: 5 TABLET, ORALLY DISINTEGRATING ORAL at 09:15

## 2018-01-04 NOTE — PCM.PYCHPN
Psychiatric Progress Note





- Psychiatric Progress Note


Patient seen today, length of contact: Patient evaluated, case discussed with 

team, chart reviewed


Patient Chief Complaint: 





I am good and I am ignoring the voices


Problems Identified/Issues Discussed: 





pt on evaluation. calm , cooperative brighter mood and affect, baseline 

whispering non command auditory hallucinations, 


attending groups, denied side effects of medications denied suicidal or 

homicidal ideations





Medical Problems: 





multiple inpatient hospitalizations for psychotic symptoms


DSM 5 Symptoms Update: 





schizophrenia


Medication Change: No


Medical Record Reviewed: Yes





Mental Status Examination





- Cognitive Function


Orientation: Person, Place


Memory: Intact


Attention: WNL


Concentration: WNL


Association: WNL


Fund of Knowledge: Poor


Decription of patient's judgement and insights: 





partial insight and poor judgement





- Mood


Mood: Neutral





- Affect


Affect: Constricted





- Speech


Speech: Soft





- Formal Thought Process


Formal Thought Process: Hallucinations, Circumstantial


Psychotic Thoughts and Behaviors: 





pt reported clearing off of the auditory hallucinations





- Suicidal Ideation


Suicidal Ideation: No





- Homicidal Ideation


Homicidal Ideation: No





Goal/Treatment Plan





- Goal/Treatment Plan


Need for Continued Stay: Remain at risks for inpatient hospitalization, Severe 

depression anxiety, Discharge may exacerbated symptoms


Progress Toward Problem(s) and Goals/Treatment Plan: 





 pt at current mental status denied any command hallucinations denied any 

suicidal or homicidal ideations 


continue with  effexor  75 mg ,and  zyprexa   20 mg, 


follow up arranged with outpatient program at [facility]


CBT group and supportive therapy


PT TO BE discharged tomorrow with PACT 


Estimated Date of D/C: 01/05/18

## 2018-01-05 RX ADMIN — VENLAFAXINE HYDROCHLORIDE SCH MG: 75 CAPSULE, EXTENDED RELEASE ORAL at 09:30

## 2018-01-05 RX ADMIN — OLANZAPINE SCH MG: 5 TABLET, ORALLY DISINTEGRATING ORAL at 09:30

## 2018-01-05 NOTE — PCM.PYCHDC
Mental Status Examination





- Mental Status Examination


Orientation: Person, Place, Situation


Memory: Intact


Mood: Neutral


Affect: Broad


Speech: Appropriate


Attention: WNL


Concentration: WNL


Association: WNL


Fund of Knowledge: WNL


Formal Thought Process: Circumstantial


Description of patient's judgement and insight: 





partial insight and poor judgement


Psychotic Thoughts and Behaviors: 





pt denied any current command hallucinations denied any psychotic symptoms, non 

elicited


Suicidal Ideation: No


Current Homicidal Ideation?: No





Discharge Summary





- Discharge Note


Reason for Hospitalization: 





pt. is a 27 year old AA single female previous diagnosis of schizoaffective 

disorder, discharged few days ago from [facility]. Pt. reported coming to 

[facility] because her worker told her to come here. Pt. reported being paranoid, 

having thoughts of being followed and persecutted from people who worked with 

her because they believe she killed her mother. Pt. is fearful she would hurt 

herself due to these intruding thoughts. Pt. also reported non command auditory 

hallucinations making her anxious, pt requested admission for medication 

stabilization





Consultations:: List each consultation separately and include:  1. Reason for 

request.  2. Findings.  3. Follow-up


Summary of Hospital Course include:: 1. Description of specific treatment plan 

utilized for patients during their course of treatmen.  2. Summarize the time-

course for resolution of acute symptoms and/or regressed behaviors.  3. 

Describe issues identified and worked on during hospitalization.  4. Describe 

medication utilized.  5. Describe medical problems identified and treated.  6. 

Reassessment of suicide risk


Summary of Hospital Course: 


pt. on admission was started on risperidone and it was uptitrated, pt had poor 

response to risperidone and started experiencing command auditory hallucinations

, to hurt herself, pt had to be placed on 1;1 observation for suicide risk and 

risperidone was cross titrated with zyprexa


zyprexa was uptitrated gradually to 20mg 


pt gradually reported clearing off of the auditory hallucinations and presented 

with brighter mood and affect


mental status on discharge pt denied any current suicidal or homicidal 

ideations denied perceptual disturbances


denied any current command hallucinations denied any current psychotic symptoms 


at current mental ststus pt not danger to self or others


follow up at [facility] out patient





- Diagnosis


(1) Schizoaffective disorder


Current Visit: Yes   Status: Acute   





- Final Diagnosis (DSM 5)


Condition upon Discharge: STABLE


Disposition: HOME/ ROUTINE


Follow-up Treatment Plan: 





 pt at current mental status denied any command hallucinations denied any 

suicidal or homicidal ideations 


continue with  effexor  75 mg ,and  zyprexa   20 mg, 


follow up arranged with outpatient program at [facility]


CBT group and supportive therapy


PT TO BE discharged tomorrow with PACT 


Prescriptions/Medication Reconciliation: 


Benztropine [Cogentin] 0.5 mg PO BID 30 Days #60 tab


Gabapentin [Neurontin] 100 mg PO TID 30 Days #90 cap


hydrOXYzine Pamoate [Vistaril] 25 mg PO TID PRN 30 Days #90 cap


 PRN Reason: Anxiety


OLANZapine [Zyprexa Zydis] 10 mg PO HS 30 Days #30 odt


OLANZapine [Zyprexa Zydis] 5 mg PO BID 30 Days #60 odt


Petrolatum [Vaseline Oint] 1 pkt TOP Q8 PRN 7 Days #1 fp


 PRN Reason: Dry Skin


Venlafaxine [Effexor XR] 75 mg PO DAILY 30 Days #30 cer





- Antipsychotic Medications


Pt discharged on 2 or more routine antipsychotic medications: No

## 2018-01-12 ENCOUNTER — HOSPITAL ENCOUNTER (INPATIENT)
Dept: HOSPITAL 14 - H.ER | Age: 28
LOS: 20 days | Discharge: HOME | DRG: 430 | End: 2018-02-01
Attending: PSYCHIATRY & NEUROLOGY | Admitting: PSYCHIATRY & NEUROLOGY
Payer: COMMERCIAL

## 2018-01-12 VITALS — OXYGEN SATURATION: 100 %

## 2018-01-12 DIAGNOSIS — R00.0: ICD-10-CM

## 2018-01-12 DIAGNOSIS — R45.851: ICD-10-CM

## 2018-01-12 DIAGNOSIS — F25.9: Primary | ICD-10-CM

## 2018-01-12 LAB
ALBUMIN SERPL-MCNC: 4 G/DL (ref 3.5–5)
ALBUMIN/GLOB SERPL: 1.1 {RATIO} (ref 1–2.1)
ALT SERPL-CCNC: 30 U/L (ref 9–52)
AST SERPL-CCNC: 19 U/L (ref 14–36)
BACTERIA #/AREA URNS HPF: (no result) /[HPF]
BASOPHILS # BLD AUTO: 0.1 K/UL (ref 0–0.2)
BASOPHILS NFR BLD: 1 % (ref 0–2)
BILIRUB UR-MCNC: NEGATIVE MG/DL
BUN SERPL-MCNC: 7 MG/DL (ref 7–17)
CALCIUM SERPL-MCNC: 8.9 MG/DL (ref 8.4–10.2)
COLOR UR: YELLOW
EOSINOPHIL # BLD AUTO: 0.1 K/UL (ref 0–0.7)
EOSINOPHIL NFR BLD: 0.8 % (ref 0–4)
ERYTHROCYTE [DISTWIDTH] IN BLOOD BY AUTOMATED COUNT: 13 % (ref 11.5–14.5)
GFR NON-AFRICAN AMERICAN: > 60
GLUCOSE UR STRIP-MCNC: (no result) MG/DL
HDLC SERPL-MCNC: 50 MG/DL (ref 30–70)
HGB BLD-MCNC: 11.8 G/DL (ref 12–16)
LDLC SERPL-MCNC: 61 MG/DL (ref 0–129)
LEUKOCYTE ESTERASE UR-ACNC: (no result) LEU/UL
LYMPHOCYTES # BLD AUTO: 2 K/UL (ref 1–4.3)
LYMPHOCYTES NFR BLD AUTO: 27.9 % (ref 20–40)
MCH RBC QN AUTO: 29.6 PG (ref 27–31)
MCHC RBC AUTO-ENTMCNC: 32.4 G/DL (ref 33–37)
MCV RBC AUTO: 91.6 FL (ref 81–99)
MONOCYTES # BLD: 0.7 K/UL (ref 0–0.8)
MONOCYTES NFR BLD: 9.2 % (ref 0–10)
NEUTROPHILS # BLD: 4.5 K/UL (ref 1.8–7)
NEUTROPHILS NFR BLD AUTO: 61.1 % (ref 50–75)
NRBC BLD AUTO-RTO: 0.2 % (ref 0–0)
PH UR STRIP: 7 [PH] (ref 5–8)
PLATELET # BLD: 229 K/UL (ref 130–400)
PMV BLD AUTO: 9.3 FL (ref 7.2–11.7)
PROT UR STRIP-MCNC: NEGATIVE MG/DL
RBC # BLD AUTO: 3.97 MIL/UL (ref 3.8–5.2)
RBC # UR STRIP: NEGATIVE /UL
SP GR UR STRIP: 1.02 (ref 1–1.03)
SQUAMOUS EPITHIAL: 13 /HPF (ref 0–5)
URINE CLARITY: (no result)
URINE NITRATE: NEGATIVE
UROBILINOGEN UR-MCNC: 4 MG/DL (ref 0.2–1)
WBC # BLD AUTO: 7.3 K/UL (ref 4.8–10.8)

## 2018-01-12 PROCEDURE — GZHZZZZ GROUP PSYCHOTHERAPY: ICD-10-PCS | Performed by: PSYCHIATRY & NEUROLOGY

## 2018-01-12 PROCEDURE — GZ56ZZZ INDIVIDUAL PSYCHOTHERAPY, SUPPORTIVE: ICD-10-PCS | Performed by: PSYCHIATRY & NEUROLOGY

## 2018-01-12 NOTE — PCM.BM
<Nasima Morales - Last Filed: 01/12/18 14:54>





Treatment Plan Problems





- Problems identified on initial assessmt


  ** Auditory Hallucinations


Date Initiated: 01/12/18


Time Initiated: 14:54


Assessment reference: NA


Status: Active





Treatment assets and liabiliti


Patient Assests: adapts well, cooperative, ADL independent, physically healthy, 

good support system, negotiates basic needs


Patient Liabilities: live alone, financial problems, poor support system





- Milieu Protocol


Maintain good personal hygiene: daily Encourage regular showers, daily Remind 

patient to perform daily oral care, daily Assist patient to perform ADL's


Conduct patient checks and document Observation sheet: Q15 minutes


Maintain personal safety: every shift Educate patient to report safety concerns 

to staff, every shift Monitor environment for contraband/sharps


Medication safety: Monitor for expected outcome, potential side effects: every 

shift, Assess barriers to learning: every shift, Assess readiness for 

medication education: every shift





<Thuy Whelan - Last Filed: 01/17/18 14:08>





Treatment assets and liabiliti


Patient Assests: adapts well, cooperative, ADL independent, physically healthy, 

good support system, negotiates basic needs


Patient Liabilities: live alone, financial problems, poor support system, other 

(possible cognitive delays)





Family Contact


Family involvement: Famliy/SO not involved (patients aunt limitedly involved)


Family contact name: Smooth (aunt) ([phone]) 


Family contacted how many times per week?: 2





- Outside Agency


  ** Agency 1


Care involvment: Other (patient is in transition from [facility])


Agency contact name: [facility]


Agency contact number: [phone]





  ** Agency 2


Care involvment: Following patient during stay, Other (patient recently 

completed intake with Dr. Skinner and is scheduled to begin PHP in the upcoming 

weeks)


Agency contact name: [facility]


Agency contact number: [phone]





- Goals for Treatment


Patient goals for treatment: Patient to continue stabilization on 3NP through 

medication management and group/supportive therapy. Patient to be encouraged to 

attend groups regularly to promote self-awareness, compliance, and improve 

insight, coping skills, social skills and self-esteem. Patient to be provided 

with referral for appropriate level of aftercare to reduce risk of future 

hospitalizations and ensure safety in the community.





Discharge/Continuing Care





- Education Needs


Education Needs: Patient Medication, Patient Coping Skills, Patient Community 

resources, Patient Aftercare Safety Plan





- Discharge


Discharge Criteria: Tolerates medication w/o severe side effects, Free of 

Suicidal thoughts, Free of paranoid thoughts, Free of agitation, Normal sleep 

pattern, Ability to care for self, Reduction of target symptoms


Discharge to:: Home, Other ([facility] )





- Treatment Team Participation


Patient/Family/SO Statement: 





01/17/18 14:08


Patient attended tx team this morning to discuss progress on 3NP. Patient 

continues to report auditory hallucinations (derogatory and command in nature), 

stating "Die" and "Don't be shy". Patient reports "the voices are confusing". 

Patient presents as depressed and anxious. Patient presents with paranoia, 

believing people (providers included) want to die and are out to harm her and 

her family. Patient expressed feeling unsafe secondary to acute psychotic 

symptoms. Reassurance and support provided. Patient presents as internally 

preoccupied with poor focus. Responses somewhat delayed. Patient presents as 

tangential and loosened associations. Insight is limited. Coping skills/

judgment remain impaired. Affect is flat. Medications discussed. Writer to 

continue meeting with patient through-out stabilization to devise safe 

discharge plan.


Discussed with Family/SO: Yes


Was Patient/Family/SO present at Treatment Team Meeting: Yes





<Monica Cornejo - Last Filed: 01/18/18 12:01>





- Diagnosis


(1) Psychosis


Status: Acute   


Interventions: 





01/18/18 12:00


psychotherapy pharmacotherapy

## 2018-01-12 NOTE — PCM.PSYCH
Initial Psychiatric Evaluation





- Initial Psychiatric Evaluation


Legal Status: Capacity


Chief Complaint (in patient's own words): 





the workers at [facility] mabel making fun of me they have control over my body


Patient's Reaction to Hospitalization: 





pt requested help


History of Present Illness and Precipitating Events: 





pt with previous diagnosis of schizophrenia. recently discharged from [facility], pt 

attended the program today and presented with auditory hallucinations , hearing 

voices telling her she should die for madai, pt , floridaly psychotic 

beleiving that the workers at [employer] she worked with before are after her and 

trying to control her body and hurt her 


pt experiencing paranoid delusions and somatic delusions


she denied command hallucinations , denied homicidal ideations, reported 

feeling increasingly depressed and anxious  


Current Medications: 





Active Medications











Generic Name Dose Route Start Last Admin





  Trade Name Freq  PRN Reason Stop Dose Admin


 


Chlorpromazine  100 mg  01/12/18 14:51  





  Thorazine  PO   





  Q6 PRN   





  Agitation   


 


Chlorpromazine  50 mg  01/12/18 14:53  





  Thorazine  IM   





  Q6 PRN   





  Agitation   


 


Clozapine  25 mg  01/12/18 14:50  





  Clozaril  PO   





  DAILY JOSE   


 


Gabapentin  100 mg  01/12/18 17:00  





  Neurontin  PO   





  TID JOSE   


 


Hydroxyzine Pamoate  50 mg  01/12/18 14:54  





  Vistaril  PO   





  Q6 PRN   





  Anaphylaxis   


 


Olanzapine  5 mg  01/13/18 09:00  





  Zyprexa  PO   





  DAILY JOSE   


 


Olanzapine  10 mg  01/12/18 22:00  





  Zyprexa  PO   





  HS JOSE   


 


Venlafaxine HCl  75 mg  01/13/18 09:00  





  Effexor  PO   





  DAILY JOSE   














Past Psychiatric History





- Past Psychiatric History


Explanation of prior treatment: 





multiple inpatient hospitalizations 


History of Family Illness: 





mothe rhistory of schizophrenia


Pertinent Medical Hx (Current Medical&Sleep Prob, Allergies): 





 Allergies











Allergy/AdvReac Type Severity Reaction Status Date / Time


 


haloperidol [From Haldol] Allergy  ANGIOEDEMA Verified 12/19/17 13:23








 





Benztropine [Cogentin] 0.5 mg PO BID 01/12/18 


Gabapentin [Neurontin] 100 mg PO TID 01/12/18 


OLANZapine [Zyprexa Zydis] 10 mg PO BID 01/12/18 


hydrOXYzine Pamoate [Vistaril] 25 mg PO TID PRN 01/12/18 











Mental Status Examination





- Personal Presentation


Personal Presentation: Looks stated age





- Affect


Affect: Depressed





- Motor Activity


Motor Activity: Psychomotor Retardation





- Reliability in Providing Information


Reliability in Providing Information: Poor, due to altered mood





- Speech


Speech: Disorganized





- Mood


Mood: Depressed, Anxious





- Formal Thought Process


Formal Thought Process: Hallucinations, Delusions, Paranoia, Loosening of 

associations





- Hallucinations/Delusions


Hallucinations: Auditory


Delusions: Persecution


Additional comments: 





pt reported non command auditory hallucinations





- Obsessions/Compulsions


Obsessions: No





- Cognitive Functions


Orientation: Person, Place


Attention/Concentration: Easily distracted


Abstract Thinking: Porter


Estimate of Intelligence: Below average





- Risk


Risk: Diminished functioning





- Strength & Assets Inventory


Strength & Assets Inventory: Family support





- Limitations


Additional comments: 





current unemployment





DSM 5 DX





- DSM 5


DSM 5 Diagnosis: 





schizophrenia





- Recommended/Plan of Treatment


Treatment Recommendations and Plan of Treatment: 





pt has been tried on multiple antipsychotics


start zyprexa 15mg


will cross titrate zyprexa with clozaril


wbc noted 7.5 will start clozaril 25


monitor pt for psychopharmacological effects and side effect profile


start neurontin 100mg tid


effexor 75 mg daily


group and supportive therapy


Projected ELOS: 14 days

## 2018-01-12 NOTE — CP.PCM.CON
History of Present Illness





- History of Present Illness


History of Present Illness: 





HPI: Patient is a 26 y/o female with Schitzoaffective disorder who presented to 

the hospital because she was having paranoid thoughts and auditory 

hallucinations that she believes may have led her to harm herself or others. 

She was recently admitted for the same reason. She was concerned that she was 

hearing voices and they were getting worse. She denies any fevers/chills/

headache/recent illnesses/nausea/vomting/diarrhea.





PMD: Elias Nicole 





PMHX: Schitzoaffective Disorder





PSurgical Hx: Denies





Allergies: Haloperidol





Family Hx: Uncle- Guru





Social: Lives in group home, denies alcohol use, smoking, or illicit drug use





Review of Systems





- Review of Systems


Review of Systems: 





12 point review of systems was conducted and was negative other than what was 

mentioned in the History of Present Illness. 





Past Patient History





- Infectious Disease


Hx of Infectious Diseases: None





- Tetanus Immunizations


Tetanus Immunization: Unknown





- Past Medical History & Family History


Past Medical History?: No





- Past Social History


Smoking Status: Never Smoked





- CARDIAC


Hx Cardiac Disorders: No


Hx Hypertension: No





- PULMONARY


Hx Respiratory Disorders: No


Hx Tuberculosis: No





- NEUROLOGICAL


Hx Neurological Disorder: No


HX Cerebrovascular Accident: No


Hx Seizures: No





- HEENT


Hx HEENT Problems: No





- RENAL


Hx Chronic Kidney Disease: No





- ENDOCRINE/METABOLIC


Hx Endocrine Disorders: No





- HEMATOLOGICAL/ONCOLOGICAL


Hx Blood Disorders: No


Hx Cancer: No


Hx Human Immunodeficiency Virus (HIV): No





- INTEGUMENTARY


Hx Dermatological Problems: No





- MUSCULOSKELETAL/RHEUMATOLOGICAL


Hx Musculoskeletal Disorders: No





- GASTROINTESTINAL


Hx Gastrointestinal Disorders: No





- GENITOURINARY/GYNECOLOGICAL


Hx Genitourinary Disorders: No


Hx Sexually Transmitted Disorders: No





- PSYCHIATRIC


Hx Physical Abuse: No


Hx Sexual Abuse: No


Hx Substance Use: No





- SURGICAL HISTORY


Hx Surgeries: No





- ANESTHESIA


Hx Anesthesia: No





Meds


Allergies/Adverse Reactions: 


 Allergies











Allergy/AdvReac Type Severity Reaction Status Date / Time


 


haloperidol [From Haldol] Allergy  ANGIOEDEMA Verified 12/19/17 13:23














- Medications


Medications: 


 Current Medications





Chlorpromazine (Thorazine)  100 mg PO Q6 PRN


   PRN Reason: Agitation


   Last Admin: 01/12/18 16:29 Dose:  100 mg


Chlorpromazine (Thorazine)  50 mg IM Q6 PRN


   PRN Reason: Agitation


Clozapine (Clozaril)  25 mg PO DAILY JOSE


Gabapentin (Neurontin)  100 mg PO TID [facility]


   Last Admin: 01/12/18 17:18 Dose:  100 mg


Hydroxyzine Pamoate (Vistaril)  50 mg PO Q6 PRN


   PRN Reason: Anaphylaxis


Olanzapine (Zyprexa)  5 mg PO DAILY JOSE


Olanzapine (Zyprexa)  10 mg PO HS JOSE


Venlafaxine HCl (Effexor)  75 mg PO DAILY [facility]











Physical Exam





- Constitutional


Additional comments: 








Physical exam:





Constitutional- cooperative, awake, alert


Head- NCAT, PERRL


Eye- PERRL, EOMI


ENT- normal exam, MMM.


Neck- normal inspection, supple, no JVD


Respiratory- CTAB, no wheezes rales rhonchi


Cardiovascular- tachycardia, regular rhythm, +S1, +S2 no MRG


GI/Abdominal- normal bowel sounds, soft, no mass, no hsm


Skin- warm, dry


Extremities Exam- normal capillary refill, normal inspection


Neurological Exam- alert, awake, oriented


Psych- Anxious, c/o voices, bizarre affect





Results





- Vital Signs


Recent Vital Signs: 


 Last Vital Signs











Temp  98.2 F   01/12/18 14:07


 


Pulse  102 H  01/12/18 14:07


 


Resp  18   01/12/18 14:40


 


BP  132/80   01/12/18 14:07


 


Pulse Ox  100   01/12/18 11:33














- Labs


Result Diagrams: 


 01/12/18 12:10





 01/12/18 12:10


Labs: 


 Laboratory Results - last 24 hr











  01/12/18 01/12/18 01/12/18





  11:35 11:35 12:10


 


WBC   


 


RBC   


 


Hgb   


 


Hct   


 


MCV   


 


MCH   


 


MCHC   


 


RDW   


 


Plt Count   


 


MPV   


 


Neut % (Auto)   


 


Lymph % (Auto)   


 


Mono % (Auto)   


 


Eos % (Auto)   


 


Baso % (Auto)   


 


Neut #   


 


Lymph #   


 


Mono #   


 


Eos #   


 


Baso #   


 


Sodium    140


 


Potassium    3.6


 


Chloride    104


 


Carbon Dioxide    25


 


Anion Gap    15


 


BUN    7


 


Creatinine    0.5 L


 


Est GFR ( Amer)    > 60


 


Est GFR (Non-Af Amer)    > 60


 


Random Glucose    87


 


Calcium    8.9


 


Total Bilirubin    0.2


 


AST    19


 


ALT    30


 


Alkaline Phosphatase    83


 


Total Protein    7.8


 


Albumin    4.0


 


Globulin    3.8


 


Albumin/Globulin Ratio    1.1


 


Triglycerides   


 


Cholesterol   


 


LDL Cholesterol Direct   


 


HDL Cholesterol   


 


Urine Color   Yellow 


 


Urine Clarity   Cloudy 


 


Urine pH   7.0 


 


Ur Specific Gravity   1.020 


 


Urine Protein   Negative 


 


Urine Glucose (UA)   Neg 


 


Urine Ketones   Negative 


 


Urine Blood   Negative 


 


Urine Nitrate   Negative 


 


Urine Bilirubin   Negative 


 


Urine Urobilinogen   4.0 H 


 


Ur Leukocyte Esterase   Neg 


 


Urine RBC (Auto)   1 


 


Urine Microscopic WBC   1 


 


Ur Squamous Epith Cells   13 H 


 


Urine Bacteria   Rare 


 


Urine Opiates Screen  Negative  


 


Urine Methadone Screen  Negative  


 


Ur Barbiturates Screen  Negative  


 


Ur Phencyclidine Scrn  Negative  


 


Ur Amphetamines Screen  Negative  


 


U Benzodiazepines Scrn  Negative  


 


U Oth Cocaine Metabols  Negative  


 


U Cannabinoids Screen  Negative  


 


Alcohol, Quantitative    < 10














  01/12/18 01/12/18





  12:10 16:00


 


WBC  7.3 


 


RBC  3.97 


 


Hgb  11.8 L 


 


Hct  36.4 


 


MCV  91.6 


 


MCH  29.6 


 


MCHC  32.4 L 


 


RDW  13.0 


 


Plt Count  229 


 


MPV  9.3 


 


Neut % (Auto)  61.1 


 


Lymph % (Auto)  27.9 


 


Mono % (Auto)  9.2 


 


Eos % (Auto)  0.8 


 


Baso % (Auto)  1.0 


 


Neut #  4.5 


 


Lymph #  2.0 


 


Mono #  0.7 


 


Eos #  0.1 


 


Baso #  0.1 


 


Sodium  


 


Potassium  


 


Chloride  


 


Carbon Dioxide  


 


Anion Gap  


 


BUN  


 


Creatinine  


 


Est GFR ( Amer)  


 


Est GFR (Non-Af Amer)  


 


Random Glucose  


 


Calcium  


 


Total Bilirubin  


 


AST  


 


ALT  


 


Alkaline Phosphatase  


 


Total Protein  


 


Albumin  


 


Globulin  


 


Albumin/Globulin Ratio  


 


Triglycerides   198 H D


 


Cholesterol   155


 


LDL Cholesterol Direct   61


 


HDL Cholesterol   50


 


Urine Color  


 


Urine Clarity  


 


Urine pH  


 


Ur Specific Gravity  


 


Urine Protein  


 


Urine Glucose (UA)  


 


Urine Ketones  


 


Urine Blood  


 


Urine Nitrate  


 


Urine Bilirubin  


 


Urine Urobilinogen  


 


Ur Leukocyte Esterase  


 


Urine RBC (Auto)  


 


Urine Microscopic WBC  


 


Ur Squamous Epith Cells  


 


Urine Bacteria  


 


Urine Opiates Screen  


 


Urine Methadone Screen  


 


Ur Barbiturates Screen  


 


Ur Phencyclidine Scrn  


 


Ur Amphetamines Screen  


 


U Benzodiazepines Scrn  


 


U Oth Cocaine Metabols  


 


U Cannabinoids Screen  


 


Alcohol, Quantitative  














Assessment & Plan





- Assessment and Plan (Free Text)


Plan: 





Assessment: 


 Patient is a 26 y/o female with Schitzoaffective disorder admitted for 

suicidal ideation in the presence of persecutory auditory hallucinations. 








#Schizoaffective Disorder, acute


-as per psychiatry


-Currently receiving Risperidone injections 


-Ativan prn 





#Sinus Tachycardia, borderline


-Likely anxiety related- noted on previous admission as well.


-Thyroid labs normal


-U tox normal


-Will continue to monitor 





#Diet


-Regular

## 2018-01-12 NOTE — ED PDOC
HPI: Psych/Substance Abuse


Time Seen by Provider: 01/12/18 11:15


Chief Complaint (Nursing): Psychiatric Evaluation


Chief Complaint (Provider): psych


History Per: Patient (28 y/o female h/o schizoaffective disorder recently 

discharged 1/5/2018 states she has persistent auditory hallucinations.  States 

hallucinations are telling her she needs to die so that certain institutions 

can be sued.  Patient feels she has no one to talk to at home as everyone else 

is preoccupied with problem.  Would like medications adjusted.)





Past Medical History


Reviewed: Historical Data, Nursing Documentation, Vital Signs


Vital Signs: 





 Last Vital Signs











Temp  98.2 F   01/12/18 11:18


 


Pulse  102 H  01/12/18 11:18


 


Resp  20   01/12/18 11:18


 


BP  132/80   01/12/18 11:18


 


Pulse Ox  100   01/12/18 11:18














- Medical History


PMH: Anemia, Anxiety, Bipolar Disorder, Bronchitis, Depression, Schizophrenia


   Denies: Diabetes, Hepatitis, HIV, HTN, Chronic Kidney Disease, Seizures, 

Sexually Transmitted Disease





- Family History


Family History: States: Unknown Family Hx





- Immunization History


Hx Influenza Vaccination: No





- Home Medications


Home Medications: 


 Ambulatory Orders











 Medication  Instructions  Recorded


 


Benztropine [Cogentin] 0.5 mg PO BID 01/12/18


 


Gabapentin [Neurontin] 100 mg PO TID 01/12/18


 


OLANZapine [Zyprexa Zydis] 10 mg PO BID 01/12/18


 


hydrOXYzine Pamoate [Vistaril] 25 mg PO TID PRN 01/12/18














- Allergies


Allergies/Adverse Reactions: 


 Allergies











Allergy/AdvReac Type Severity Reaction Status Date / Time


 


haloperidol [From Haldol] Allergy  ANGIOEDEMA Verified 12/19/17 13:23














Review of Systems


ROS Statement: Except As Marked, All Systems Reviewed And Found Negative





Physical Exam





- Reviewed


Nursing Documentation Reviewed: Yes


Vital Signs Reviewed: Yes





- Physical Exam


Appears: Positive for: Well, Non-toxic, No Acute Distress


Head Exam: Positive for: ATRAUMATIC, NORMAL INSPECTION, NORMOCEPHALIC


Skin: Positive for: Normal Color, Warm, DRY


Eye Exam: Positive for: EOMI, Normal appearance, PERRL


ENT: Positive for: Normal ENT Inspection


Neck: Positive for: Normal, Painless ROM


Cardiovascular/Chest: Positive for: Regular Rate, Rhythm


Respiratory: Positive for: CNT, Normal Breath Sounds


Gastrointestinal/Abdominal: Positive for: Normal Exam, Bowel Sounds, Soft


Back: Positive for: Normal Inspection


Extremity: Positive for: Normal ROM


Neurologic/Psych: Positive for: Alert, Oriented





- Laboratory Results


Result Diagrams: 


 01/12/18 12:10





 01/12/18 12:10





- ECG


O2 Sat by Pulse Oximetry: 100





- Progress


ED Course And Treament: 





seen by crisis





admit to [facility]





diagnosis schizophrenia





Disposition





- Clinical Impression


Clinical Impression: 


 Schizophrenia








- Patient ED Disposition


Is Patient to be Admitted: Yes





- Disposition


Disposition Time: 12:39


Condition: FAIR





- Pt Status Changed To:


Hospital Disposition Of: Inpatient





- Admit Certification


Admit to Inpatient:: After my assessment, the patient will require 

hospitalization for at least two midnights.  This is because of the severity of 

symptoms shown, intensity of services needed, and/or the medical risk in this 

patient being treated as an outpatient.

## 2018-01-13 NOTE — PCM.PYCHPN
Psychiatric Progress Note





- Psychiatric Progress Note


Patient seen today, length of contact: pt evaluated discussed with team chart 

reviewed


Patient Chief Complaint: 





the voices are trying to stop my heart they are trying to hurt me


Problems Identified/Issues Discussed: 





pt on evaluationfloridaly psychotic, having somatic delusions beleiving the 

voices have control on her body function and trying to stop her heart


pt reported having auditory hallucinations stating she hears voices telling her 

she has to die for madai


pt denied active suicidal ideation on the unit reported she is trying to 

distract herself , denied homicidal ideations


no reported changes in sleep or appetite


no reported side effects of medications


Medical Problems: 





multiple inpatient hospitalizations 


DSM 5 Symptoms Update: 





schizophrenia


Medication Change: Yes (increase clozaril)


Medical Record Reviewed: Yes





Mental Status Examination





- Cognitive Function


Orientation: Person, Place


Attention: WNL


Concentration: Poor


Association: Loose


Fund of Knowledge: Poor


Decription of patient's judgement and insights: 





partial insight and poor judgment





- Mood


Mood: Depressed, Anxious





- Affect


Affect: Depressed





- Speech


Speech: Soft





- Formal Thought Process


Formal Thought Process: Hallucinations, Delusions, Paranoia, Loosening of 

associations


Psychotic Thoughts and Behaviors: 





pt has non command auditory hallucinations and paranoid delusions





- Suicidal Ideation


Suicidal Ideation: No





- Homicidal Ideation


Homicidal Ideation: No





Goal/Treatment Plan





- Goal/Treatment Plan


Need for Continued Stay: Remain at risks for inpatient hospitalization, 

Discharge may exacerbated symptoms


Progress Toward Problem(s) and Goals/Treatment Plan: 





increase clozaril 25mg bid as pt continues to be floridaly psychotic, monitor 

wbc and anc


monitor pt for psychopharmacological effects and side effect profile


continue zyprexa, will be gradually cross titrated with clozaril


start neurontin 100mg tid


effexor 75 mg daily


group and supportive therapy


Estimated Date of D/C: 01/27/18

## 2018-01-13 NOTE — CARD
--------------- APPROVED REPORT --------------





EKG Measurement

Heart [id number]

[id number]

[id number]

[id number]

[id number]



<Conclusion>

Sinus tachycardia

Possible Left atrial enlargement

Nonspecific T wave abnormality

Abnormal ECG

## 2018-01-14 NOTE — PCM.PYCHPN
Psychiatric Progress Note





- Psychiatric Progress Note


Patient seen today, length of contact: pt evaluated discussed with team chart 

reviewed


Patient Chief Complaint: 





the voices are making me anxious they tell me people are making fun of me


Problems Identified/Issues Discussed: 





pt on evaluation presenting with anxious mood and affect, reported feeling 

distressed by the voices mocking her and putting her down


pt reported presenting with paranoid delusions stating that others are making 

fun of her and that her family wants her dead


continues to be actively psychotic


pt denied command hallucinations denied active suicidal or homicidal ideations 

on the unit


no reported side effects of clozaril


Medical Problems: 





multiple inpatient hospitalizations 


DSM 5 Symptoms Update: 





schizophrenia


Medication Change: Yes (increase neurontin)


Medical Record Reviewed: Yes





Mental Status Examination





- Cognitive Function


Orientation: Person, Place


Attention: WNL


Concentration: Poor


Association: Loose


Fund of Knowledge: Poor


Decription of patient's judgement and insights: 





partial insight and poor judgment





- Mood


Mood: Depressed, Anxious





- Affect


Affect: Depressed





- Speech


Speech: Soft





- Formal Thought Process


Formal Thought Process: Hallucinations, Delusions, Paranoia, Loosening of 

associations


Psychotic Thoughts and Behaviors: 





pt has non command auditory hallucinations and paranoid delusions





- Suicidal Ideation


Suicidal Ideation: No





- Homicidal Ideation


Homicidal Ideation: No





Goal/Treatment Plan





- Goal/Treatment Plan


Need for Continued Stay: Remain at risks for inpatient hospitalization, 

Discharge may exacerbated symptoms


Progress Toward Problem(s) and Goals/Treatment Plan: 





continue  clozaril 25mg bid as pt continues to be floridaly psychotic, monitor 

wbc and anc


monitor pt for psychopharmacological effects and side effect profile


continue zyprexa, will be gradually cross titrated with clozaril


increase  neurontin 200mg tid


effexor 75 mg daily


group and supportive therapy


Estimated Date of D/C: 01/27/18

## 2018-01-15 LAB
BASOPHILS # BLD AUTO: 0 K/UL (ref 0–0.2)
BASOPHILS NFR BLD: 0.7 % (ref 0–2)
EOSINOPHIL # BLD AUTO: 0.2 K/UL (ref 0–0.7)
EOSINOPHIL NFR BLD: 3.6 % (ref 0–4)
ERYTHROCYTE [DISTWIDTH] IN BLOOD BY AUTOMATED COUNT: 12.9 % (ref 11.5–14.5)
HGB BLD-MCNC: 12.3 G/DL (ref 12–16)
LYMPHOCYTES # BLD AUTO: 1.6 K/UL (ref 1–4.3)
LYMPHOCYTES NFR BLD AUTO: 24.2 % (ref 20–40)
MCH RBC QN AUTO: 30.8 PG (ref 27–31)
MCHC RBC AUTO-ENTMCNC: 33.9 G/DL (ref 33–37)
MCV RBC AUTO: 90.8 FL (ref 81–99)
MONOCYTES # BLD: 0.6 K/UL (ref 0–0.8)
MONOCYTES NFR BLD: 9.1 % (ref 0–10)
NEUTROPHILS # BLD: 4.2 K/UL (ref 1.8–7)
NEUTROPHILS NFR BLD AUTO: 62.4 % (ref 50–75)
NRBC BLD AUTO-RTO: 0 % (ref 0–0)
PLATELET # BLD: 229 K/UL (ref 130–400)
PMV BLD AUTO: 9.3 FL (ref 7.2–11.7)
RBC # BLD AUTO: 3.98 MIL/UL (ref 3.8–5.2)
WBC # BLD AUTO: 6.8 K/UL (ref 4.8–10.8)

## 2018-01-15 NOTE — PCM.PYCHPN
Psychiatric Progress Note





- Psychiatric Progress Note


Patient seen today, length of contact: pt evaluated discussed with team chart 

reviewed


Patient Chief Complaint: 





I still hear the voices, they tell me I am A looser


Problems Identified/Issues Discussed: 





pt on evaluation continues to feel  anxious as the voices keeps putting her 

down and mocking her , anxious and dysphoric affect


ptcontinues to have  paranoid delusions stating that others are making fun of 

her and that her family wants her dead


continues to be actively psychotic


pt denied command hallucinations denied active suicidal or homicidal ideations 

on the unit


no reported side effects of clozaril, vital signs are stable no reported chest 

pain


wbc reppeated and noted to be 6.9 , will uptitrate clozaril as discussed with pt


Medical Problems: 





multiple inpatient hospitalizations 


DSM 5 Symptoms Update: 





schizophrenia


Medication Change: Yes (increase clozaril)


Medical Record Reviewed: Yes





Mental Status Examination





- Cognitive Function


Orientation: Person, Place


Attention: WNL


Concentration: Poor


Association: Loose


Fund of Knowledge: Poor


Decription of patient's judgement and insights: 





partial insight and poor judgment





- Mood


Mood: Depressed, Anxious





- Affect


Affect: Depressed





- Speech


Speech: Soft





- Formal Thought Process


Formal Thought Process: Hallucinations, Delusions, Paranoia, Loosening of 

associations


Psychotic Thoughts and Behaviors: 





pt has non command auditory hallucinations and paranoid delusions





- Suicidal Ideation


Suicidal Ideation: No





- Homicidal Ideation


Homicidal Ideation: No





Goal/Treatment Plan





- Goal/Treatment Plan


Need for Continued Stay: Remain at risks for inpatient hospitalization, 

Discharge may exacerbated symptoms


Progress Toward Problem(s) and Goals/Treatment Plan: 





increase  clozaril to 50 mg bid as pt continues to be floridaly psychotic, 

monitor wbc and anc, wbc 1/15/18 6.9


monitor pt for psychopharmacological effects and side effect profile


continue zyprexa, will be gradually cross titrated with clozaril


continue with   neurontin 200mg tid and effexor 75 mg daily


group and supportive therapy


Estimated Date of D/C: 01/27/18

## 2018-01-16 NOTE — PCM.PYCHPN
Psychiatric Progress Note





- Psychiatric Progress Note


Patient seen today, length of contact: pt evaluated discussed with team chart 

reviewed


Patient Chief Complaint: 





I try to distract myself but the voices keep telling me I am a disgrace to my 

family


Problems Identified/Issues Discussed: 





pt on evaluation continues to have auditory hallucinations , reported non 

command in nature yet making her very anxious as they keep putting her down and 

criticizing all her actions, pt continues to be unkempt , not attending to her 

personal hygiene


isolative in her room as she feels that other people are judging her


discussed with pt some coping skills with the auditory hallucinations and 

importance ot attending groups


no reported changes in sleep or appetite, pt denied command hallucinations, 

denied active suicidal or homicidal ideations


no reported side effects of clozaril


Medical Problems: 





multiple inpatient hospitalizations 


DSM 5 Symptoms Update: 





schizophrenia


Medication Change: No


Medical Record Reviewed: Yes





Mental Status Examination





- Cognitive Function


Orientation: Person, Place


Attention: WNL


Concentration: Poor


Association: Loose


Fund of Knowledge: Poor


Decription of patient's judgement and insights: 





partial insight and poor judgment





- Mood


Mood: Depressed, Anxious





- Affect


Affect: Depressed





- Speech


Speech: Soft





- Formal Thought Process


Formal Thought Process: Hallucinations, Delusions, Paranoia, Loosening of 

associations


Psychotic Thoughts and Behaviors: 





pt has non command auditory hallucinations and paranoid delusions





- Suicidal Ideation


Suicidal Ideation: No





- Homicidal Ideation


Homicidal Ideation: No





Goal/Treatment Plan





- Goal/Treatment Plan


Need for Continued Stay: Remain at risks for inpatient hospitalization, 

Discharge may exacerbated symptoms


Progress Toward Problem(s) and Goals/Treatment Plan: 





continue with  clozaril  50 mg bid with plan to uptitrate graduallyas pt 

continues to be floridaly psychotic, monitor wbc and anc, wbc 1/15/18 6.9


monitor pt for psychopharmacological effects and side effect profile


continue zyprexa, will be gradually cross titrated with clozaril


continue with   neurontin 200mg tid and effexor 75 mg daily


group and supportive therapy


Estimated Date of D/C: 01/27/18

## 2018-01-17 NOTE — PCM.PYCHPN
Psychiatric Progress Note





- Psychiatric Progress Note


Patient seen today, length of contact: pt evaluated discussed with team chart 

reviewed


Patient Chief Complaint: 





the voices are on and off but they control my life and body, they tell me my 

[employer] worker wants me to die


Problems Identified/Issues Discussed: 





pt evaluated in the treatment team, unkempt, poor personal hygiene presenting 

with anxious mood and affect, pt reported voices now are on and off but they 

keep controlling her life , putting her down and telling her not to trust any 

body even her [employer] worker, pt presenting with paranoid delusions towards her 

family and also hospital staff, stating the voices are telling her that she is 

a burden on every body


discussed with pt possible coping skills with the voices, the need for her to 

attaend groups and to distract herself also trying to reason if possible with 

the reality of their statements


pt denied any current active suicidal or homicidal ideations or plan


no reported side effects of clozaril, no chest pains, no fever


discussed with pt gradual titration of clozaril


Medical Problems: 





multiple inpatient hospitalizations 


DSM 5 Symptoms Update: 





schizophrenia


Medication Change: No


Medical Record Reviewed: Yes





Mental Status Examination





- Cognitive Function


Orientation: Person, Place


Attention: WNL


Concentration: Poor


Association: Loose


Fund of Knowledge: Poor


Decription of patient's judgement and insights: 





partial insight and poor judgment





- Mood


Mood: Depressed, Anxious





- Affect


Affect: Depressed





- Speech


Speech: Soft





- Formal Thought Process


Formal Thought Process: Hallucinations, Delusions, Paranoia, Loosening of 

associations


Psychotic Thoughts and Behaviors: 





pt has non command auditory hallucinations and paranoid delusions





- Suicidal Ideation


Suicidal Ideation: No





- Homicidal Ideation


Homicidal Ideation: No





Goal/Treatment Plan





- Goal/Treatment Plan


Need for Continued Stay: Remain at risks for inpatient hospitalization, 

Discharge may exacerbated symptoms


Progress Toward Problem(s) and Goals/Treatment Plan: 





continue with  clozaril  50 mg bid with plan to uptitrate graduallyas pt 

continues to be floridaly psychotic, monitor wbc and anc, wbc 1/15/18 6.9, anc 

4.9


monitor pt for psychopharmacological effects and side effect profile


continue zyprexa, will be gradually cross titrated with clozaril


continue with   neurontin 200mg tid 


increase  effexor to 150mg daily for depression and anxiety


group and supportive therapy


Estimated Date of D/C: 01/27/18

## 2018-01-18 RX ADMIN — VENLAFAXINE HYDROCHLORIDE SCH MG: 150 CAPSULE, EXTENDED RELEASE ORAL at 09:00

## 2018-01-18 NOTE — PCM.PYCHPN
Psychiatric Progress Note





- Psychiatric Progress Note


Patient seen today, length of contact: pt evaluated discussed with team chart 

reviewed


Patient Chief Complaint: 





the voices now tell me good things they tell me they want to  me


Problems Identified/Issues Discussed: 





pt on evaluation continues to have auditory hallucinations , stated they are 

now telling her happy things and they want to  her, pt however continues 

to be floridaly psychotic stated that she would not drink coffee otherwise she 

would die, stated she can hear previous collegues in her previous job making 

fun of her


pt continues to be overwhelmed and anxious about her housin situation, needs a 

lot of encouragment to attend to her personal hygiene and to attend groups


pt denied any current command hallucinations, denied any active suicdal or 

homicidal ideations, no reported side effects of medications


Medical Problems: 





multiple inpatient hospitalizations 


DSM 5 Symptoms Update: 





schizophrenia


Medication Change: Yes (increase clozaril)


Medical Record Reviewed: Yes





Mental Status Examination





- Cognitive Function


Orientation: Person, Place


Attention: WNL


Concentration: Poor


Association: Loose


Fund of Knowledge: Poor


Decription of patient's judgement and insights: 





partial insight and poor judgment





- Mood


Mood: Depressed, Anxious





- Affect


Affect: Depressed





- Speech


Speech: Soft





- Formal Thought Process


Formal Thought Process: Hallucinations, Delusions, Paranoia, Loosening of 

associations


Psychotic Thoughts and Behaviors: 





pt has non command auditory hallucinations and paranoid delusions





- Suicidal Ideation


Suicidal Ideation: No





- Homicidal Ideation


Homicidal Ideation: No





Goal/Treatment Plan





- Goal/Treatment Plan


Need for Continued Stay: Remain at risks for inpatient hospitalization, 

Discharge may exacerbated symptoms


Progress Toward Problem(s) and Goals/Treatment Plan: 





increase   clozaril to 125mg daily with plan to uptitrate gradually as pt 

continues to be floridaly psychotic, monitor wbc and anc, wbc 1/15/18 6.9, anc 

4.9


monitor pt for psychopharmacological effects and side effect profile


continue zyprexa, will be gradually cross titrated with clozaril


continue with   neurontin 200mg tid 


increase  effexor to 150mg daily for depression and anxiety


group and supportive therapy


Estimated Date of D/C: 01/27/18

## 2018-01-19 RX ADMIN — VENLAFAXINE HYDROCHLORIDE SCH MG: 150 CAPSULE, EXTENDED RELEASE ORAL at 08:49

## 2018-01-19 NOTE — PCM.PYCHPN
Psychiatric Progress Note





- Psychiatric Progress Note


Patient seen today, length of contact: pt evaluated discussed with team chart 

reviewed


Patient Chief Complaint: 





the voices still bother me they want me to get 


Problems Identified/Issues Discussed: 





pt on evaluation appears less anxious, stated that the voices are on and off 

continue to comment on her behavior , telling her they want to  her


pt continues to present with disorganized thought process and paranoid 

ideations towards her family, beleiving her family member wants her dead


pt need a lot of encouragment to attend groups and to attend to her personal 

hygiene


pt denied command hallucinations


denied any current suicidal or homicidal ideations





no reported side effects of medications


Medical Problems: 





multiple inpatient hospitalizations 


DSM 5 Symptoms Update: 





schizophrenia


Medication Change: Yes (decrease zyprexa to 10mg)


Medical Record Reviewed: Yes





Mental Status Examination





- Cognitive Function


Orientation: Person, Place


Attention: WNL


Concentration: Poor


Association: Loose


Fund of Knowledge: Poor


Decription of patient's judgement and insights: 





partial insight and poor judgment





- Mood


Mood: Depressed, Anxious





- Affect


Affect: Depressed





- Speech


Speech: Soft





- Formal Thought Process


Formal Thought Process: Hallucinations, Delusions, Paranoia, Loosening of 

associations


Psychotic Thoughts and Behaviors: 





pt has non command auditory hallucinations and paranoid delusions





- Suicidal Ideation


Suicidal Ideation: No





- Homicidal Ideation


Homicidal Ideation: No





Goal/Treatment Plan





- Goal/Treatment Plan


Need for Continued Stay: Remain at risks for inpatient hospitalization, 

Discharge may exacerbated symptoms


Progress Toward Problem(s) and Goals/Treatment Plan: 





continue   clozaril 125mg daily with plan to uptitrate gradually as pt 

continues to be floridaly psychotic, monitor wbc and anc, wbc 1/15/18 6.9, anc 

4.9


monitor pt for psychopharmacological effects and side effect profile


decrease zyprexa,to 10mg  will be gradually cross titrated with clozaril


continue with   neurontin 200mg tid 


continue   effexor  150mg daily for depression and anxiety


group and supportive therapy


Estimated Date of D/C: 01/27/18

## 2018-01-20 RX ADMIN — VENLAFAXINE HYDROCHLORIDE SCH MG: 150 CAPSULE, EXTENDED RELEASE ORAL at 09:00

## 2018-01-20 NOTE — PCM.PYCHPN
Psychiatric Progress Note





- Psychiatric Progress Note


Patient seen today, length of contact: pt evaluated discussed with team chart 

reviewed


Patient Chief Complaint: 





pt still hears voices and telling her to drink coffee and about happy things 

.pt is tolerating clozaril very well and no side effects to meds 


Medication Change: Yes (decrease zyprexa to 10mg)


Medical Record Reviewed: Yes





Mental Status Examination





- Cognitive Function


Orientation: Person, Place


Attention: WNL


Concentration: Poor


Association: Loose


Fund of Knowledge: Poor





- Mood


Mood: Depressed, Anxious





- Affect


Affect: Depressed





- Speech


Speech: Soft





- Formal Thought Process


Formal Thought Process: Hallucinations, Delusions, Paranoia, Loosening of 

associations





- Suicidal Ideation


Suicidal Ideation: No





- Homicidal Ideation


Homicidal Ideation: No





Goal/Treatment Plan





- Goal/Treatment Plan


Need for Continued Stay: Remain at risks for inpatient hospitalization, 

Discharge may exacerbated symptoms


Progress Toward Problem(s) and Goals/Treatment Plan: 





will continue to titrate meds by checking CBC for clozaril and adjusting the 

dose of clozaril to stabilize the psychosis.


Disposition plans as per dr cardona


Estimated Date of D/C: 01/27/18

## 2018-01-21 RX ADMIN — VENLAFAXINE HYDROCHLORIDE SCH MG: 150 CAPSULE, EXTENDED RELEASE ORAL at 08:30

## 2018-01-21 NOTE — PCM.PYCHPN
Psychiatric Progress Note





- Psychiatric Progress Note


Patient seen today, length of contact: pt evaluated discussed with team chart 

reviewed


Patient Chief Complaint: 





pt still hears voices and telling her to drink coffee and about happy things 

.pt is tolerating clozaril very well and no side effects to meds pt c/o 

drooling and says that cogentin helps her


wBC= 6.8


no side effects to meds 


Medication Change: No


Medical Record Reviewed: Yes





Mental Status Examination





- Cognitive Function


Orientation: Person, Place


Attention: WNL


Concentration: Poor


Association: Loose


Fund of Knowledge: Poor





- Mood


Mood: Depressed, Anxious





- Affect


Affect: Depressed





- Speech


Speech: Soft





- Formal Thought Process


Formal Thought Process: Hallucinations, Delusions, Paranoia, Loosening of 

associations





- Suicidal Ideation


Suicidal Ideation: No





- Homicidal Ideation


Homicidal Ideation: No





Goal/Treatment Plan





- Goal/Treatment Plan


Need for Continued Stay: Remain at risks for inpatient hospitalization, 

Discharge may exacerbated symptoms


Progress Toward Problem(s) and Goals/Treatment Plan: 





will continue to titrate meds by checking CBC for clozaril and adjusting the 

dose of clozaril to stabilize the psychosis.


will add cogentin 0.5 mg hs 


Disposition plans as per dr cardona


Estimated Date of D/C: 01/27/18

## 2018-01-22 LAB
BASOPHILS # BLD AUTO: 0.1 K/UL (ref 0–0.2)
BASOPHILS NFR BLD: 1.1 % (ref 0–2)
EOSINOPHIL # BLD AUTO: 0.2 K/UL (ref 0–0.7)
EOSINOPHIL NFR BLD: 2.8 % (ref 0–4)
ERYTHROCYTE [DISTWIDTH] IN BLOOD BY AUTOMATED COUNT: 13 % (ref 11.5–14.5)
HGB BLD-MCNC: 12.9 G/DL (ref 12–16)
LYMPHOCYTES # BLD AUTO: 2.5 K/UL (ref 1–4.3)
LYMPHOCYTES NFR BLD AUTO: 33.9 % (ref 20–40)
MCH RBC QN AUTO: 30 PG (ref 27–31)
MCHC RBC AUTO-ENTMCNC: 33.3 G/DL (ref 33–37)
MCV RBC AUTO: 90.2 FL (ref 81–99)
MONOCYTES # BLD: 0.6 K/UL (ref 0–0.8)
MONOCYTES NFR BLD: 8.5 % (ref 0–10)
NEUTROPHILS # BLD: 3.9 K/UL (ref 1.8–7)
NEUTROPHILS NFR BLD AUTO: 53.7 % (ref 50–75)
NRBC BLD AUTO-RTO: 0.1 % (ref 0–0)
PLATELET # BLD: 236 K/UL (ref 130–400)
PMV BLD AUTO: 8.8 FL (ref 7.2–11.7)
RBC # BLD AUTO: 4.29 MIL/UL (ref 3.8–5.2)
WBC # BLD AUTO: 7.3 K/UL (ref 4.8–10.8)

## 2018-01-22 RX ADMIN — VENLAFAXINE HYDROCHLORIDE SCH MG: 150 CAPSULE, EXTENDED RELEASE ORAL at 08:50

## 2018-01-22 NOTE — PCM.PYCHPN
Psychiatric Progress Note





- Psychiatric Progress Note


Patient seen today, length of contact: pt evaluated discussed with team chart 

reviewed


Patient Chief Complaint: 





the voices are less and they tell me good things


Problems Identified/Issues Discussed: 





pt on evaluation appears less anxious, thought process less disorganized 

however pt continues to have delusions of persecution towards her family


pt continues to need a lot of encouragment to attend groups and tp attend her 

personal hygeine


reported increased salivation with clozaril, will start cogentin


denied any current suicidal or homicidal ideations


Medical Problems: 





multiple inpatient hospitalizations 


DSM 5 Symptoms Update: 





schizophrenia


Medication Change: Yes (increase clozaril)


Medical Record Reviewed: Yes





Mental Status Examination





- Cognitive Function


Orientation: Person, Place


Attention: WNL


Concentration: Poor


Association: Loose


Fund of Knowledge: Poor





- Mood


Mood: Depressed, Anxious





- Affect


Affect: Depressed





- Speech


Speech: Soft





- Formal Thought Process


Formal Thought Process: Hallucinations, Delusions, Paranoia, Loosening of 

associations





- Suicidal Ideation


Suicidal Ideation: No





- Homicidal Ideation


Homicidal Ideation: No





Goal/Treatment Plan





- Goal/Treatment Plan


Need for Continued Stay: Remain at risks for inpatient hospitalization, 

Discharge may exacerbated symptoms


Progress Toward Problem(s) and Goals/Treatment Plan: 





IncreaSE CLOZARIL  MG WBC 7, CONTINUE WITH COGENTIN


continue with   neurontin 200mg tid 


continue   effexor  150mg daily for depression and anxiety


group and supportive therapy


Estimated Date of D/C: 01/27/18

## 2018-01-23 RX ADMIN — VENLAFAXINE HYDROCHLORIDE SCH MG: 150 CAPSULE, EXTENDED RELEASE ORAL at 09:35

## 2018-01-24 RX ADMIN — VENLAFAXINE HYDROCHLORIDE SCH MG: 150 CAPSULE, EXTENDED RELEASE ORAL at 09:21

## 2018-01-24 NOTE — PCM.PYCHPN
Psychiatric Progress Note





- Psychiatric Progress Note


Patient seen today, length of contact: pt evaluated discussed with team chart 

reviewed


Patient Chief Complaint: 


I want to leave I do not want to be here


Problems Identified/Issues Discussed: 





PT  ,on evaluation anxious, sad and terful, pt reported continues to have 

auditory hallucinations telling her that no body wants to help her, pt 

continues to be disheveled , needs a lot of encouragment to attend to her 

personal hygiene


needs encouragment to attend groups, continues to have delusions of persecution 

towards staff


denied S/H I denied command hallucinations


no reported side effets of clozaril


Medical Problems: 





multiple inpatient hospitalizations 


DSM 5 Symptoms Update: 





schizophrenia


Medication Change: Yes (incease clozaril)


Medical Record Reviewed: Yes





Mental Status Examination





- Cognitive Function


Orientation: Person, Place


Attention: WNL


Concentration: Poor


Association: Loose


Fund of Knowledge: Poor





- Mood


Mood: Depressed, Anxious





- Affect


Affect: Depressed





- Speech


Speech: Soft





- Formal Thought Process


Formal Thought Process: Hallucinations, Delusions, Paranoia, Loosening of 

associations





- Suicidal Ideation


Suicidal Ideation: No





- Homicidal Ideation


Homicidal Ideation: No





Goal/Treatment Plan





- Goal/Treatment Plan


Need for Continued Stay: Remain at risks for inpatient hospitalization, 

Discharge may exacerbated symptoms


Progress Toward Problem(s) and Goals/Treatment Plan: 





discontinue zyprexa


increase clozaril to 200mg, monitor ANC and WBC


continue with   neurontin 200mg tid 


continue   effexor  150mg daily for depression and anxiety


group and supportive therapy


Estimated Date of D/C: 01/27/18

## 2018-01-25 RX ADMIN — VENLAFAXINE HYDROCHLORIDE SCH MG: 150 CAPSULE, EXTENDED RELEASE ORAL at 09:10

## 2018-01-25 NOTE — PCM.PYCHPN
Psychiatric Progress Note





- Psychiatric Progress Note


Patient seen today, length of contact: pt evaluated discussed with team chart 

reviewed


Patient Chief Complaint: 


The voices have control on my body, they make me have a heavy period


Problems Identified/Issues Discussed: 





PT  ,on evaluation continues to present with disorganized thought process with 

delusions of persecution towards her  , believing she wants her in 

[facility] and somatic delusions believing the voices are controlling her body parts 

. pt continues to experience non command auditory hallucinations , rpeorted 

they are lower than before , on and off , putting her down


pt denied any current active suicidal ideations , denied homicidal ideations, 

no reported side effects of clozaril


Medical Problems: 





multiple inpatient hospitalizations 


DSM 5 Symptoms Update: 





schizophrenia


Medication Change: No


Medical Record Reviewed: Yes





Mental Status Examination





- Cognitive Function


Orientation: Person, Place


Attention: WNL


Concentration: Poor


Association: Loose


Fund of Knowledge: Poor





- Mood


Mood: Depressed, Anxious





- Affect


Affect: Depressed





- Speech


Speech: Soft





- Formal Thought Process


Formal Thought Process: Hallucinations, Delusions, Paranoia, Loosening of 

associations





- Suicidal Ideation


Suicidal Ideation: No





- Homicidal Ideation


Homicidal Ideation: No





Goal/Treatment Plan





- Goal/Treatment Plan


Need for Continued Stay: Remain at risks for inpatient hospitalization, 

Discharge may exacerbated symptoms


Progress Toward Problem(s) and Goals/Treatment Plan: 








continue  clozaril 200mg daily , monitor ANC and WBC


continue with   neurontin 200mg tid 


continue   effexor  150mg daily for depression and anxiety


group and supportive therapy


Estimated Date of D/C: 01/27/18

## 2018-01-26 RX ADMIN — VENLAFAXINE HYDROCHLORIDE SCH MG: 150 CAPSULE, EXTENDED RELEASE ORAL at 08:28

## 2018-01-26 NOTE — PCM.PYCHPN
Psychiatric Progress Note





- Psychiatric Progress Note


Patient seen today, length of contact: pt evaluated discussed with team chart 

reviewed


Patient Chief Complaint: 


The voices are less today, i am feeling better


Problems Identified/Issues Discussed: 





PT  on evaluation, more attentive to her personal hygiene m better groomed today

, continues to have delusions of persecution, believing her [employer] worker is 

against her and wants to hurt her, discussed with pt importance of challenging 

the paranoid delusions


pt stated that the auditory hallucinations are less today 5/10 and non command 

in nature


no side effects of clozaril, other than increased salivation at night


pt denied any current suicidal or homicidal ideations


attending groups and participating in treatment 


Medical Problems: 





multiple inpatient hospitalizations 


DSM 5 Symptoms Update: 





schizophrenia


Medication Change: Yes (increase cogentin)


Medical Record Reviewed: Yes





Mental Status Examination





- Cognitive Function


Orientation: Person, Place


Attention: WNL


Concentration: Poor


Association: Loose


Fund of Knowledge: Poor





- Mood


Mood: Depressed, Anxious





- Affect


Affect: Depressed





- Speech


Speech: Soft





- Formal Thought Process


Formal Thought Process: Hallucinations, Delusions, Paranoia, Loosening of 

associations





- Suicidal Ideation


Suicidal Ideation: No





- Homicidal Ideation


Homicidal Ideation: No





Goal/Treatment Plan





- Goal/Treatment Plan


Need for Continued Stay: Remain at risks for inpatient hospitalization, 

Discharge may exacerbated symptoms


Progress Toward Problem(s) and Goals/Treatment Plan: 








continue  clozaril 200mg daily , monitor ANC and WBC


continue with   neurontin 200mg tid 


continue   effexor  150mg daily for depression and anxiety


increase cogentin to 1mg qhs


group and supportive therapy


Estimated Date of D/C: 01/31/18

## 2018-01-27 RX ADMIN — VENLAFAXINE HYDROCHLORIDE SCH MG: 150 CAPSULE, EXTENDED RELEASE ORAL at 09:12

## 2018-01-27 NOTE — PCM.PYCHPN
Psychiatric Progress Note





- Psychiatric Progress Note


Patient seen today, length of contact: Patient evaluated, case discussed with 

team, chart reviewed


Patient Chief Complaint: 


"I'm hearing voices."


Problems Identified/Issues Discussed: 


Patient continues to be paranoid and hearing voices.  She is complaint with 

medications and we discussed continued titration of Clozapine. 


Medication Change: Yes (Increase Clozapine)


Medical Record Reviewed: Yes


Consults ordered or reviewed: 


Medicine consult





Mental Status Examination





- Cognitive Function


Orientation: Person, Place, Situation


Memory: Impaired


Attention: WNL


Concentration: Poor


Association: Loose


Fund of Knowledge: Poor


Decription of patient's judgement and insights: 


Limited I/J





- Mood


Mood: Depressed, Anxious





- Affect


Affect: Depressed





- Speech


Speech: Soft





- Formal Thought Process


Formal Thought Process: Hallucinations, Delusions, Paranoia, Loosening of 

associations


Psychotic Thoughts and Behaviors: 


+AH, paranoia, delusions





- Suicidal Ideation


Suicidal Ideation: No





- Homicidal Ideation


Homicidal Ideation: No





Goal/Treatment Plan





- Goal/Treatment Plan


Need for Continued Stay: Remain at risks for inpatient hospitalization, 

Discharge may exacerbated symptoms


Progress Toward Problem(s) and Goals/Treatment Plan: 


Schizoaffective Disorder





-Increase Clozapine to 50 mg PO Daily/ 200 mg PO HS


-Continue to monitor ANC/ CBC


-Continue Effexor 150 mg PO Daily


-Continue Cogentin 1 mg PO HS


-Individual and group therapy


-Psychoeducation


-Disposition planning 


Estimated Date of D/C: 01/31/18

## 2018-01-28 LAB
BASOPHILS # BLD AUTO: 0.1 K/UL (ref 0–0.2)
BASOPHILS NFR BLD: 0.9 % (ref 0–2)
EOSINOPHIL # BLD AUTO: 0.2 K/UL (ref 0–0.7)
EOSINOPHIL NFR BLD: 2.3 % (ref 0–4)
ERYTHROCYTE [DISTWIDTH] IN BLOOD BY AUTOMATED COUNT: 12.7 % (ref 11.5–14.5)
HGB BLD-MCNC: 12.6 G/DL (ref 12–16)
LYMPHOCYTES # BLD AUTO: 2.3 K/UL (ref 1–4.3)
LYMPHOCYTES NFR BLD AUTO: 26 % (ref 20–40)
MCH RBC QN AUTO: 29.8 PG (ref 27–31)
MCHC RBC AUTO-ENTMCNC: 32.8 G/DL (ref 33–37)
MCV RBC AUTO: 91 FL (ref 81–99)
MONOCYTES # BLD: 0.7 K/UL (ref 0–0.8)
MONOCYTES NFR BLD: 8.3 % (ref 0–10)
NEUTROPHILS # BLD: 5.6 K/UL (ref 1.8–7)
NEUTROPHILS NFR BLD AUTO: 62.5 % (ref 50–75)
NRBC BLD AUTO-RTO: 0.1 % (ref 0–0)
PLATELET # BLD: 253 K/UL (ref 130–400)
PMV BLD AUTO: 9.1 FL (ref 7.2–11.7)
RBC # BLD AUTO: 4.21 MIL/UL (ref 3.8–5.2)
WBC # BLD AUTO: 9 K/UL (ref 4.8–10.8)

## 2018-01-28 RX ADMIN — VENLAFAXINE HYDROCHLORIDE SCH MG: 150 CAPSULE, EXTENDED RELEASE ORAL at 11:16

## 2018-01-28 RX ADMIN — VENLAFAXINE HYDROCHLORIDE SCH MG: 150 CAPSULE, EXTENDED RELEASE ORAL at 11:15

## 2018-01-28 NOTE — PCM.PYCHPN
Psychiatric Progress Note





- Psychiatric Progress Note


Patient seen today, length of contact: Patient evaluated, case discussed with 

team, chart reviewed


Patient Chief Complaint: 


"I'm hearing voices."


Problems Identified/Issues Discussed: 


Patient continues to be paranoid and hearing voices.  She has a delusional 

belief that someone will try to take her to court due to come issue with [employer], 

but she would not specify due to paranoia.  She continues to hear voices 

telling her to "stop being shy" and "why can't you die for Zulay?"  She 

denies current suicidal ideation/plan/intent.  She is complaint with 

medications. 


Medication Change: No


Medical Record Reviewed: Yes


Consults ordered or reviewed: 


Medicine consult





Mental Status Examination





- Cognitive Function


Orientation: Person, Place, Situation


Memory: Impaired


Attention: WNL


Concentration: Poor


Association: Loose


Fund of Knowledge: Poor


Decription of patient's judgement and insights: 


Limited I/J





- Mood


Mood: Depressed, Anxious





- Affect


Affect: Depressed





- Speech


Speech: Soft





- Formal Thought Process


Formal Thought Process: Hallucinations, Delusions, Paranoia, Loosening of 

associations


Psychotic Thoughts and Behaviors: 


+AH, paranoia, delusions





- Suicidal Ideation


Suicidal Ideation: No





- Homicidal Ideation


Homicidal Ideation: No





Goal/Treatment Plan





- Goal/Treatment Plan


Need for Continued Stay: Remain at risks for inpatient hospitalization, 

Discharge may exacerbated symptoms


Progress Toward Problem(s) and Goals/Treatment Plan: 


Schizoaffective Disorder





-Continue Clozapine 50 mg PO Daily/ 200 mg PO HS


-Continue to monitor ANC/ CBC


-Continue Effexor 150 mg PO Daily


-Continue Cogentin 1 mg PO HS


-Individual and group therapy


-Psychoeducation


-Disposition planning 


Estimated Date of D/C: 02/01/18

## 2018-01-29 RX ADMIN — VENLAFAXINE HYDROCHLORIDE SCH MG: 150 CAPSULE, EXTENDED RELEASE ORAL at 08:53

## 2018-01-29 NOTE — PCM.PYCHPN
Psychiatric Progress Note





- Psychiatric Progress Note


Patient seen today, length of contact: Patient evaluated, case discussed with 

team, chart reviewed


Patient Chief Complaint: 


"I'm getting better."


Problems Identified/Issues Discussed: 


Patient is less paranoid and reports that the voices are less.  She has not 

heard AH today.  She seems to have improved insight.  She denies current 

suicidal ideation/plan/intent.  She is complaint with medications. 


Medication Change: Yes (Lower cogentin to 0.5 mg PO HS)


Medical Record Reviewed: Yes


Consults ordered or reviewed: 


Medicine consult





Mental Status Examination





- Cognitive Function


Orientation: Person, Place, Situation, Time


Memory: Intact


Attention: WNL


Concentration: Poor


Association: WNL


Fund of Knowledge: WNL


Decription of patient's judgement and insights: 


Improving I/J





- Mood


Mood: Anxious





- Affect


Affect: Constricted





- Speech


Speech: Soft





- Formal Thought Process


Formal Thought Process: Paranoia, Loosening of associations


Psychotic Thoughts and Behaviors: 


+Less AH, Less paranoia





- Suicidal Ideation


Suicidal Ideation: No





- Homicidal Ideation


Homicidal Ideation: No





Goal/Treatment Plan





- Goal/Treatment Plan


Need for Continued Stay: Remain at risks for inpatient hospitalization, 

Discharge may exacerbated symptoms


Progress Toward Problem(s) and Goals/Treatment Plan: 


Schizoaffective Disorder





-Continue Clozapine 50 mg PO Daily/ 200 mg PO HS


-Continue to monitor ANC/ CBC


-Continue Effexor 150 mg PO Daily


-Decrease Cogentin to 0.5 mg PO HS


-Individual and group therapy


-Psychoeducation


-Disposition planning 


Estimated Date of D/C: 02/01/18

## 2018-01-30 VITALS — RESPIRATION RATE: 20 BRPM

## 2018-01-30 RX ADMIN — VENLAFAXINE HYDROCHLORIDE SCH MG: 150 CAPSULE, EXTENDED RELEASE ORAL at 09:13

## 2018-01-30 NOTE — PCM.PYCHPN
Psychiatric Progress Note





- Psychiatric Progress Note


Patient seen today, length of contact: Patient evaluated, case discussed with 

team, chart reviewed


Patient Chief Complaint: 


"I'm getting better."


Problems Identified/Issues Discussed: 


Patient reports that the voices are less frequent and intense.   She reports 

that she last heard AH yesterday.  She is more goal oriented and engaged in the 

community.   She continues to have constricted affect.  Patient is encouraged 

to participate in groups and shower.   She denies current suicidal ideation/plan

/intent.  She is complaint with medications. 


Medication Change: No


Medical Record Reviewed: Yes





Mental Status Examination





- Cognitive Function


Orientation: Person, Place, Situation, Time


Memory: Intact


Attention: WNL


Concentration: Poor


Association: WNL


Fund of Knowledge: WNL


Decription of patient's judgement and insights: 


Improving I/J





- Mood


Mood: Anxious





- Affect


Affect: Constricted





- Speech


Speech: Soft





- Formal Thought Process


Formal Thought Process: Hallucinations (Last heard yesterday), Loosening of 

associations


Psychotic Thoughts and Behaviors: 


+Less AH, no acute paranoia





- Suicidal Ideation


Suicidal Ideation: No





- Homicidal Ideation


Homicidal Ideation: No





Goal/Treatment Plan





- Goal/Treatment Plan


Need for Continued Stay: Remain at risks for inpatient hospitalization, 

Discharge may exacerbated symptoms


Progress Toward Problem(s) and Goals/Treatment Plan: 


Schizoaffective Disorder





-Continue Clozapine 50 mg PO Daily/ 200 mg PO HS


-Continue to monitor ANC/ CBC


-Continue Effexor 150 mg PO Daily


-Continue Cogentin 0.5 mg PO HS


-Individual and group therapy


-Psychoeducation


-Disposition planning- likely discharge on Thursday if patient continues to 

improve clinically


Estimated Date of D/C: 02/01/18

## 2018-01-31 VITALS — TEMPERATURE: 98.1 F | SYSTOLIC BLOOD PRESSURE: 139 MMHG | HEART RATE: 118 BPM | DIASTOLIC BLOOD PRESSURE: 73 MMHG

## 2018-01-31 LAB
BASOPHILS # BLD AUTO: 0.1 K/UL (ref 0–0.2)
BASOPHILS NFR BLD: 1 % (ref 0–2)
EOSINOPHIL # BLD AUTO: 0.3 K/UL (ref 0–0.7)
EOSINOPHIL NFR BLD: 4.4 % (ref 0–4)
ERYTHROCYTE [DISTWIDTH] IN BLOOD BY AUTOMATED COUNT: 12.6 % (ref 11.5–14.5)
HGB BLD-MCNC: 12.4 G/DL (ref 12–16)
LYMPHOCYTES # BLD AUTO: 2 K/UL (ref 1–4.3)
LYMPHOCYTES NFR BLD AUTO: 27.8 % (ref 20–40)
MCH RBC QN AUTO: 30.2 PG (ref 27–31)
MCHC RBC AUTO-ENTMCNC: 33.3 G/DL (ref 33–37)
MCV RBC AUTO: 91 FL (ref 81–99)
MONOCYTES # BLD: 0.7 K/UL (ref 0–0.8)
MONOCYTES NFR BLD: 9.9 % (ref 0–10)
NEUTROPHILS # BLD: 4 K/UL (ref 1.8–7)
NEUTROPHILS NFR BLD AUTO: 56.9 % (ref 50–75)
NRBC BLD AUTO-RTO: 0.4 % (ref 0–0)
PLATELET # BLD: 254 K/UL (ref 130–400)
PMV BLD AUTO: 9.5 FL (ref 7.2–11.7)
RBC # BLD AUTO: 4.11 MIL/UL (ref 3.8–5.2)
WBC # BLD AUTO: 7.1 K/UL (ref 4.8–10.8)

## 2018-01-31 RX ADMIN — VENLAFAXINE HYDROCHLORIDE SCH MG: 150 CAPSULE, EXTENDED RELEASE ORAL at 08:56

## 2018-01-31 NOTE — PCM.PYCHPN
Psychiatric Progress Note





- Psychiatric Progress Note


Patient seen today, length of contact: Patient evaluated, case discussed with 

team, chart reviewed


Patient Chief Complaint: 


"I'm getting better."


Problems Identified/Issues Discussed: 


Patient continues to improve clinically.  She reports less AH, last heard 

yesterday.  NO acute paranoia/delusions.  NO SI/HI.  We discussed the r/b/se of 

Clozapine and the importance of compliance with treatment and medications. We 

discussed likely discharge tomorrow if she continues to improve clinically.  





CBC/ANC faxed to [facility] today.


Medication Change: No


Medical Record Reviewed: Yes


Consults ordered or reviewed: 


Medicine consult





Mental Status Examination





- Cognitive Function


Orientation: Person, Place, Situation, Time


Memory: Intact


Attention: WNL


Concentration: Poor


Association: WNL


Fund of Knowledge: WNL


Decription of patient's judgement and insights: 


Improving I/J





- Mood


Mood: Neutral





- Affect


Affect: Constricted





- Speech


Speech: Appropriate





- Formal Thought Process


Formal Thought Process: No Impairment


Psychotic Thoughts and Behaviors: 


Denies acute AH/paranoia





- Suicidal Ideation


Suicidal Ideation: No





- Homicidal Ideation


Homicidal Ideation: No





Goal/Treatment Plan





- Goal/Treatment Plan


Need for Continued Stay: Remain at risks for inpatient hospitalization, 

Discharge may exacerbated symptoms


Progress Toward Problem(s) and Goals/Treatment Plan: 


Schizoaffective Disorder





-Continue Clozapine 50 mg PO Daily/ 200 mg PO HS


-Continue to monitor ANC/ CBC


-Continue Effexor 150 mg PO Daily


-Continue Cogentin 0.5 mg PO HS


-Individual and group therapy


-Psychoeducation


-Disposition planning- likely discharge on Thursday if patient continues to 

improve clinically


Estimated Date of D/C: 02/01/18

## 2018-02-01 RX ADMIN — VENLAFAXINE HYDROCHLORIDE SCH MG: 150 CAPSULE, EXTENDED RELEASE ORAL at 08:00

## 2018-02-01 NOTE — PCM.PYCHDC
Mental Status Examination





- Mental Status Examination


Orientation: Person, Place, Situation, Time


Memory: Intact


Mood: Neutral


Affect: Broad


Speech: Appropriate


Attention: WNL


Concentration: WNL


Association: WNL


Fund of Knowledge: WNL


Formal Thought Process: No Impairment


Description of patient's judgement and insight: 


Fair I/J


Psychotic Thoughts and Behaviors: 


No AH/VH/paranoia


Suicidal Ideation: No


Current Homicidal Ideation?: No





Discharge Summary





- Discharge Note


Reason for Hospitalization: 


HPI: 26 yo female w/ h/o schizoaffective disorder, recently discharged from [facility]

, presents with worsening auditory hallucinations, paranoia, persecutory 

delusions, somatic delusions.  +Feelings of depression/anxiety/hopelessness.





PPHx: Multiple past psychiatric admissions for psychosis.





PMHX: No acute medical issues





PSurgical Hx: Denies





Allergies: Haloperidol





Family Hx: Uncle- Schizophrenia





Social: Lives in group home, denies alcohol use, smoking, or illicit drug use


Consultations:: List each consultation separately and include:  1. Reason for 

request.  2. Findings.  3. Follow-up


Consultations: 


Medicine consult


Summary of Hospital Course include:: 1. Description of specific treatment plan 

utilized for patients during their course of treatmen.  2. Summarize the time-

course for resolution of acute symptoms and/or regressed behaviors.  3. 

Describe issues identified and worked on during hospitalization.  4. Describe 

medication utilized.  5. Describe medical problems identified and treated.  6. 

Reassessment of suicide risk


Summary of Hospital Course: 


Patient was admitted to the psychiatry unit.  Individual and group therapy were 

provided.  Patient was started on Clozapine and titrated up to 50 mg PO AM/ 200 

mg PO HS.  She was also stabilized on Effexor, Gabapentin, and Benztropin.  She 

reports improvement of her psychotic symptoms and upon discharge did not have 

any acute psychotic symptoms.  Psychoeducation provided re: the importance of 

compliance with treatment, medications and routine blood draws.  ANC 4 on 1/31/ 18.   ANC faxed to [facility] on 1/31/18. 





- Final Diagnosis (DSM 5)


Condition upon Discharge: STABLE


DSM 5: 


Schizoaffective Disorder


Disposition: HOME/ ROUTINE


Follow-up Treatment Plan: 


Schizoaffective Disorder





-Continue Clozapine 50 mg PO Daily/ 200 mg PO HS


-Continue to monitor ANC/ CBC


-Continue Effexor 150 mg PO Daily


-Continue Cogentin 0.5 mg PO HS


-Psychoeducation


-Disposition planning- discharge w/ outpatient follow-up


Prescriptions/Medication Reconciliation: 


Benztropine [Cogentin] 0.5 mg PO HS #30 tab


Clozapine 200 mg PO HS #7 tablet


Clozapine 50 mg PO DAILY #7 tablet


Gabapentin [Neurontin] 200 mg PO TID #180 cap


Venlafaxine [Effexor XR] 150 mg PO DAILY #30 cer





- Smoking Cessation


Smoking Cessation Medication prescribed: No


Reason for not providing: Not indicated





- Antipsychotic Medications


Pt discharged on 2 or more routine antipsychotic medications: No

## 2018-09-17 ENCOUNTER — HOSPITAL ENCOUNTER (EMERGENCY)
Dept: HOSPITAL 14 - H.ER | Age: 28
Discharge: HOME | End: 2018-09-17
Payer: COMMERCIAL

## 2018-09-17 VITALS
RESPIRATION RATE: 18 BRPM | DIASTOLIC BLOOD PRESSURE: 82 MMHG | SYSTOLIC BLOOD PRESSURE: 138 MMHG | OXYGEN SATURATION: 100 % | TEMPERATURE: 98 F | HEART RATE: 100 BPM

## 2018-09-17 DIAGNOSIS — Z32.02: Primary | ICD-10-CM

## 2018-09-17 NOTE — ED PDOC
HPI: General Adult


Time Seen by Provider: 09/17/18 15:28


Chief Complaint (Nursing): Medical Clearance


Chief Complaint (Provider): Abdominal cramp


History Per: Patient


History/Exam Limitations: no limitations


Additional Complaint(s): 


27yo female, otherwise well, comes to ER for evaluation of abdominal cramping. 

Patient states she is concerned she might be pregnant. She reports her LMP was 

on August 20th and she is due for her next menstrual period on September 21st. 

She denies any vaginal bleeding or discharge; she has no other medical 

complaints.





Past Medical History


Reviewed: Historical Data, Nursing Documentation, Vital Signs


Vital Signs: 


 Last Vital Signs











Temp  98 F   09/17/18 15:23


 


Pulse  100 H  09/17/18 15:23


 


Resp  18   09/17/18 15:23


 


BP  138/82   09/17/18 15:23


 


Pulse Ox  100   09/17/18 15:35














- Medical History


PMH: Anemia, Anxiety, Bipolar Disorder, Bronchitis, Depression, Schizophrenia


   Denies: Diabetes, Hepatitis, HIV, HTN, Chronic Kidney Disease, Seizures, 

Sexually Transmitted Disease





- Surgical History


Surgical History: No Surg Hx





- Family History


Family History: States: No Known Family Hx





- Living Arrangements


Living Arrangements: With Family





- Immunization History


Hx Influenza Vaccination: No





- Home Medications


Home Medications: 


 Ambulatory Orders











 Medication  Instructions  Recorded


 


Benztropine [Cogentin] 0.5 mg PO HS #30 tab 01/30/18


 


Clozapine 50 mg PO DAILY #7 tablet 01/30/18


 


Clozapine 200 mg PO HS #7 tablet 01/30/18


 


Gabapentin [Neurontin] 200 mg PO TID #180 cap 01/30/18


 


Venlafaxine [Effexor XR] 150 mg PO DAILY #30 cer 01/30/18














- Allergies


Allergies/Adverse Reactions: 


 Allergies











Allergy/AdvReac Type Severity Reaction Status Date / Time


 


haloperidol [From Haldol] Allergy  ANGIOEDEMA Verified 12/19/17 13:23














Review of Systems


ROS Statement: Except As Marked, All Systems Reviewed And Found Negative


Gastrointestinal: Positive for: Other (abdominal cramps)


Genitourinary Female: Negative for: Vaginal Discharge, Vaginal Bleeding





Physical Exam





- Reviewed


Nursing Documentation Reviewed: Yes


Vital Signs Reviewed: Yes





- Physical Exam


Appears: Positive for: Non-toxic, No Acute Distress


Head Exam: Positive for: ATRAUMATIC, NORMAL INSPECTION, NORMOCEPHALIC


Skin: Positive for: Normal Color


Neck: Positive for: Supple


Cardiovascular/Chest: Positive for: Regular Rate, Rhythm


Respiratory: Positive for: Normal Breath Sounds


Neurologic/Psych: Positive for: Alert, Oriented





- Laboratory Results


Urine Pregnancy POC: Negative





- ECG


O2 Sat by Pulse Oximetry: 100 (RA)


Pulse Ox Interpretation: Normal





Medical Decision Making


Medical Decision Making: 


Impression: Abdominal discomfort


Plan:


* UPreg








1534


Upreg is negative.


Patient informed of results and is stable for discharge home.








--------------------------------------------------------------------------------

------------------------------------





Scribe Attestation:


Documented by Pilra Das acting as a scribe for LOAN Loredo. 





Provider Attestation:


All medical record entries made by the Scribe were at my direction and 

personally dictated by me. I have reviewed the chart and agree that the record 

accurately reflects my personal performance of the history, physical exam, 

medical decision making, and the department course for this patient. I have 

also personally directed, reviewed, and agree with the discharge instructions 

and disposition.








Disposition





- Clinical Impression


Clinical Impression: 


 Negative pregnancy test








- Disposition


Disposition: Routine/Home


Disposition Time: 15:35


Condition: STABLE


Instructions:  Pregnancy Tests


Forms:  [employer] (English)

## 2019-02-13 ENCOUNTER — HOSPITAL ENCOUNTER (EMERGENCY)
Dept: HOSPITAL 14 - H.ER | Age: 29
Discharge: HOME | End: 2019-02-13
Payer: MEDICARE

## 2019-02-13 VITALS
RESPIRATION RATE: 20 BRPM | SYSTOLIC BLOOD PRESSURE: 127 MMHG | TEMPERATURE: 98.3 F | DIASTOLIC BLOOD PRESSURE: 70 MMHG | OXYGEN SATURATION: 98 %

## 2019-02-13 VITALS — BODY MASS INDEX: 35.5 KG/M2

## 2019-02-13 VITALS — HEART RATE: 82 BPM

## 2019-02-13 DIAGNOSIS — Z88.8: ICD-10-CM

## 2019-02-13 DIAGNOSIS — Z86.59: ICD-10-CM

## 2019-02-13 DIAGNOSIS — L25.9: Primary | ICD-10-CM

## 2019-02-13 NOTE — ED PDOC
HPI: General Adult


Time Seen by Provider: 02/13/19 07:08


Chief Complaint (Nursing): Abnormal Skin Integrity


Chief Complaint (Provider): Abnormal Skin Integrity


History Per: Patient


History/Exam Limitations: no limitations


Onset/Duration Of Symptoms: Days (x7)


Current Symptoms Are (Timing): Still Present


Additional Complaint(s): 


Loan Spencer is a 28 year old female with a past medical history of anxiety, 

depression, and anemia who is presenting to the ED for evaluation of mildly 

itchy rash to left arm onset 1 week ago. Patient states that she didnt get a 

bite and reports that the rash is not spreading. She denies any shortness of 

breath, chest pain or any other medical complaints.





PMD: Lorne Haynes











Past Medical History


Reviewed: Historical Data, Nursing Documentation, Vital Signs


Vital Signs: 





                                Last Vital Signs











Temp  98.3 F   02/13/19 07:04


 


Pulse  114 H  02/13/19 07:04


 


Resp  20   02/13/19 07:04


 


BP  127/70   02/13/19 07:04


 


Pulse Ox  98   02/13/19 07:04














- Medical History


PMH: Anemia, Anxiety, Bipolar Disorder, Bronchitis, Depression, Schizophrenia


   Denies: Diabetes, Hepatitis, HIV, HTN, Chronic Kidney Disease, Seizures, 

Sexually Transmitted Disease





- Surgical History


Surgical History: No Surg Hx





- Family History


Family History: States: Other


Other Family History: psychiatric family history





- Social History


Current smoker - smoking cessation education provided: No


Alcohol: None


Drugs: Denies





- Immunization History


Hx Influenza Vaccination: No





- Home Medications


Home Medications: 


                                Ambulatory Orders











 Medication  Instructions  Recorded


 


RX: Benztropine [Cogentin] 0.5 mg PO HS #30 tab 01/30/18


 


RX: Clozapine 50 mg PO DAILY #7 tablet 01/30/18


 


RX: Clozapine 200 mg PO HS #7 tablet 01/30/18


 


RX: Gabapentin [Neurontin] 200 mg PO TID #180 cap 01/30/18


 


RX: Venlafaxine [Effexor XR] 150 mg PO DAILY #30 cer 01/30/18


 


Triamcinolone 0.1% [Triamcinolone 1 appful TP BID #15 tube 02/13/19





0.1% Cream]  














- Allergies


Allergies/Adverse Reactions: 


                                    Allergies











Allergy/AdvReac Type Severity Reaction Status Date / Time


 


haloperidol [From Haldol] Allergy  ANGIOEDEMA Verified 12/19/17 13:23














Review of Systems


ROS Statement: Except As Marked, All Systems Reviewed And Found Negative


Cardiovascular: Negative for: Chest Pain


Respiratory: Negative for: Shortness of Breath


Skin: Positive for: Rash





Physical Exam





- Reviewed


Nursing Documentation Reviewed: Yes


Vital Signs Reviewed: Yes





- Physical Exam


Appears: Positive for: Well, Non-toxic, No Acute Distress


Head Exam: Positive for: ATRAUMATIC, NORMAL INSPECTION, NORMOCEPHALIC


Skin: Positive for: Rash (2 areas of erythema to left forearm: 1.5 cm in 

diameter with mild induration)


Eye Exam: Positive for: Normal appearance


Extremity: Positive for: Normal ROM.  Negative for: Deformity, Swelling


Neurologic/Psych: Positive for: Alert, Oriented.  Negative for: Motor/Sensory 

Deficits





- ECG


O2 Sat by Pulse Oximetry: 98 (RA)


Pulse Ox Interpretation: Normal





Medical Decision Making


Medical Decision Making: 


---------------------------------------------------------

----------------------------------------


Scribe Attestation:   


Documented by Dory Chand, acting as a scribe for Nanda Leggett MD. 





Provider Scribe Attestation:


All medical record entries made by the Scribe were at my direction and 

personally dictated by me. I have reviewed the chart and agree that the record 

accurately reflects my personal performance of the history, physical exam, 

medical decision making, and the department course for this patient. I have also

personally directed, reviewed, and agree with the discharge instructions and 

disposition.











Disposition





- Clinical Impression


Clinical Impression: 


 Contact dermatitis








- Patient ED Disposition


Is Patient to be Admitted: No


Doctor Will See Patient In The: Office





- Disposition


Referrals: 


Lorne Martell MD [Staff Provider] - 


Disposition: Routine/Home


Disposition Time: 15:00


Condition: STABLE


Prescriptions: 


Triamcinolone 0.1% [Triamcinolone 0.1% Cream] 1 appful TP BID #15 tube


Instructions:  Contact Dermatitis (DC)


Forms:  [facility] (English), [facility] ED School/Work Excuse





- POA


Present On Arrival: None